# Patient Record
Sex: MALE | Race: WHITE | NOT HISPANIC OR LATINO | Employment: FULL TIME | URBAN - METROPOLITAN AREA
[De-identification: names, ages, dates, MRNs, and addresses within clinical notes are randomized per-mention and may not be internally consistent; named-entity substitution may affect disease eponyms.]

---

## 2019-04-25 ENCOUNTER — TELEPHONE (OUTPATIENT)
Dept: SCHEDULING | Facility: IMAGING CENTER | Age: 49
End: 2019-04-25

## 2019-05-07 ENCOUNTER — OFFICE VISIT (OUTPATIENT)
Dept: MEDICAL GROUP | Facility: PHYSICIAN GROUP | Age: 49
End: 2019-05-07
Payer: COMMERCIAL

## 2019-05-07 VITALS
HEART RATE: 70 BPM | RESPIRATION RATE: 18 BRPM | DIASTOLIC BLOOD PRESSURE: 70 MMHG | BODY MASS INDEX: 29.92 KG/M2 | WEIGHT: 209 LBS | HEIGHT: 70 IN | SYSTOLIC BLOOD PRESSURE: 108 MMHG | TEMPERATURE: 98.4 F | OXYGEN SATURATION: 97 %

## 2019-05-07 DIAGNOSIS — E78.5 HYPERLIPIDEMIA, UNSPECIFIED HYPERLIPIDEMIA TYPE: ICD-10-CM

## 2019-05-07 DIAGNOSIS — Z79.899 ON STATIN THERAPY: ICD-10-CM

## 2019-05-07 DIAGNOSIS — R73.01 IMPAIRED FASTING GLUCOSE: ICD-10-CM

## 2019-05-07 DIAGNOSIS — Z72.0 TOBACCO USE: ICD-10-CM

## 2019-05-07 DIAGNOSIS — Z23 NEED FOR VACCINATION: ICD-10-CM

## 2019-05-07 DIAGNOSIS — I10 ESSENTIAL HYPERTENSION: ICD-10-CM

## 2019-05-07 DIAGNOSIS — B07.8 OTHER VIRAL WARTS: ICD-10-CM

## 2019-05-07 DIAGNOSIS — R20.9 SKIN SENSATION DISTURBANCE: ICD-10-CM

## 2019-05-07 PROCEDURE — 90471 IMMUNIZATION ADMIN: CPT | Performed by: FAMILY MEDICINE

## 2019-05-07 PROCEDURE — 99204 OFFICE O/P NEW MOD 45 MIN: CPT | Mod: 25 | Performed by: FAMILY MEDICINE

## 2019-05-07 PROCEDURE — 90715 TDAP VACCINE 7 YRS/> IM: CPT | Performed by: FAMILY MEDICINE

## 2019-05-07 PROCEDURE — 17003 DESTRUCT PREMALG LES 2-14: CPT | Performed by: FAMILY MEDICINE

## 2019-05-07 PROCEDURE — 17000 DESTRUCT PREMALG LESION: CPT | Performed by: FAMILY MEDICINE

## 2019-05-07 RX ORDER — LISINOPRIL 5 MG/1
5 TABLET ORAL DAILY
Qty: 90 TAB | Refills: 1 | Status: SHIPPED | OUTPATIENT
Start: 2019-05-07 | End: 2019-10-25 | Stop reason: SDUPTHER

## 2019-05-07 RX ORDER — VARENICLINE TARTRATE 1 MG/1
1 TABLET, FILM COATED ORAL 2 TIMES DAILY
Qty: 60 TAB | Refills: 3 | Status: SHIPPED | OUTPATIENT
Start: 2019-05-07 | End: 2019-05-21 | Stop reason: SDUPTHER

## 2019-05-07 RX ORDER — ROSUVASTATIN CALCIUM 5 MG/1
5 TABLET, COATED ORAL EVERY EVENING
COMMUNITY
End: 2020-07-22 | Stop reason: SDUPTHER

## 2019-05-07 RX ORDER — LISINOPRIL 10 MG/1
10 TABLET ORAL DAILY
COMMUNITY
End: 2019-05-07

## 2019-05-07 ASSESSMENT — PATIENT HEALTH QUESTIONNAIRE - PHQ9: CLINICAL INTERPRETATION OF PHQ2 SCORE: 0

## 2019-05-07 NOTE — PROGRESS NOTES
"cc: Hypertension      Subjective:     Juan M Medellin is a 49 y.o. male presenting for the following:     Hypertension: diagnosed by his last PCP. Patient reports that his BP was never very elevated and has been low while on lisinopril. He does get slightly dizzy with bending over but he is not sure if it has always been like that or if it got worse with the lisinopril.    Hyperlipidemia: Has been taking low dose crestor for many months without side effect. Was having this checked at least yearly. Last check in Jan 2018.     Was having his HA1C checked every 3 months for prediabetes. Patient denies any polyuria/polydipsia, rashes, recurrent infections, changes in vision, changes in sensation.    Warts: Patient first started with wart on his left index finger in his 20s but it has now spread to cover most of the finger tip. It is not painful but it is not pretty. Also, he has had 2 clusters on his right knuckles for more than a year that are not improving. These he does sometimes bump and they are painful.     Review of systems:  All others reviewed and are negative.       Current Outpatient Prescriptions:   •  rosuvastatin (CRESTOR) 5 MG Tab, Take 5 mg by mouth every evening., Disp: , Rfl:   •  lisinopril (PRINIVIL) 5 MG Tab, Take 1 Tab by mouth every day., Disp: 90 Tab, Rfl: 1  •  varenicline (CHANTIX STARTING MONTH DEENA) 0.5 MG X 11 & 1 MG X 42 tablet, Days 1-3: 0.5 mg once daily. Days 4-7: 0.5 mg twice daily. Then 1 mg twice daily., Disp: 56 Tab, Rfl: 0  •  varenicline (CHANTIX CONTINUING MONTH DEENA) 1 MG tablet, Take 1 Tab by mouth 2 times a day., Disp: 60 Tab, Rfl: 3    Allergies, past medical history, past surgical history, family history, social history reviewed and updated    Objective:     Vitals: /70 (BP Location: Left arm, Patient Position: Sitting, BP Cuff Size: Adult)   Pulse 70   Temp 36.9 °C (98.4 °F) (Temporal)   Resp 18   Ht 1.778 m (5' 10\")   Wt 94.8 kg (209 lb)   SpO2 97%   BMI 29.99 " kg/m²   General: Alert, pleasant, NAD  HEENT: Normocephalic.   EOMI, no icterus or pallor.  Conjunctivae and lids normal. External ears normal. Oropharynx non-erythematous, mucous membranes moist.    Neck supple.  No thyromegaly or masses palpated. No cervical or supraclavicular lymphadenopathy.  Heart: Regular rate and rhythm.  S1 and S2 normal.  No murmurs appreciated.  Respiratory: Normal respiratory effort.  Clear to auscultation bilaterally.  Abdomen: Non-distended, soft  Skin: Warm, dry. Right 3rd MCP joint with cluster of raised fingerlike projections and small, ~2mm area on 4th MCP joint.   Left 2nd digit with pale skin in patch covering distal finger.   Musculoskeletal: Gait is normal.  Moves all extremities well.  Extremities: No leg edema.    Neurological: gait is normal, CN2-12 grossly intact  Psych:  Affect is normal, grooming is appropriate.    CRYOTHERAPY:  Discussed risks of cryotherapy including being ineffective, ulceration at site, skin discoloration. Patient verbally agreed. 3 applications of cryotherapy were applied to 2 lesions on right hand, on the 2nd and 3rd MCP joints. Patient tolerated procedure well. Aftercare instructions given.      Assessment/Plan:     Juan M was seen today for annual exam.    Diagnoses and all orders for this visit:    Impaired fasting glucose: We will monitor with hemoglobin A1c to ensure no progression to diabetes.  -     HEMOGLOBIN A1C; Future    Essential hypertension: Patient slightly hypotensive today and does get dizzy with bending.  Will decrease dose to 5 mg tablet.  We will also check kidney and hepatic function.  -     lisinopril (PRINIVIL) 5 MG Tab; Take 1 Tab by mouth every day.  -     Comp Metabolic Panel; Future    Hyperlipidemia, unspecified hyperlipidemia type: Chronic well-controlled problem with low-dose Crestor daily.  On statin therapy  -     Comp Metabolic Panel; Future    Need for vaccination Patient due for vaccination.  Patient warned of  possible side effect including pain, reaction at injection site, fatigue, low-grade fever.  Also, patient warned of uncommon severe reactions including allergic reaction/anaphylaxis.   -     Tdap Vaccine =>8YO IM    Tobacco use: Patient has used Chantix in the past but still found it difficult to quit.  But now, his job is less conducive to smoking so he believes that he may be more successful.  Patient warned of common side effects of Chantix.  -     varenicline (CHANTIX STARTING MONTH DEENA) 0.5 MG X 11 & 1 MG X 42 tablet; Days 1-3: 0.5 mg once daily. Days 4-7: 0.5 mg twice daily. Then 1 mg twice daily.  -     varenicline (CHANTIX CONTINUING MONTH DEENA) 1 MG tablet; Take 1 Tab by mouth 2 times a day.    Other viral warts: Area of right hand treated with cryotherapy today.  But area on left index finger covering most of finger and will likely need more treatment than cryotherapy alone.  Will refer to dermatology.  -     REFERRAL TO DERMATOLOGY      Return in about 6 months (around 11/7/2019), or if symptoms worsen or fail to improve.

## 2019-05-21 DIAGNOSIS — Z72.0 TOBACCO USE: ICD-10-CM

## 2019-05-21 NOTE — TELEPHONE ENCOUNTER
*PT NEEDS TO ESTABLISH WITH A PCP,  PHARMACY REQUESTING 90 DAYS SUPPLY*  Was the patient seen in the last year in this department? Yes    Does patient have an active prescription for medications requested? Yes    Received Request Via: Pharmacy      Pt met protocol?: Yes    LAST OV 05/07/2019

## 2019-05-22 RX ORDER — VARENICLINE TARTRATE 1 MG/1
1 TABLET, FILM COATED ORAL 2 TIMES DAILY
Qty: 180 TAB | Refills: 0 | Status: SHIPPED | OUTPATIENT
Start: 2019-05-22 | End: 2020-07-22

## 2020-01-24 ENCOUNTER — APPOINTMENT (RX ONLY)
Dept: URBAN - METROPOLITAN AREA CLINIC 4 | Facility: CLINIC | Age: 50
Setting detail: DERMATOLOGY
End: 2020-01-24

## 2020-01-24 DIAGNOSIS — B07.8 OTHER VIRAL WARTS: ICD-10-CM

## 2020-01-24 PROBLEM — D48.5 NEOPLASM OF UNCERTAIN BEHAVIOR OF SKIN: Status: ACTIVE | Noted: 2020-01-24

## 2020-01-24 PROCEDURE — 17110 DESTRUCTION B9 LES UP TO 14: CPT

## 2020-01-24 PROCEDURE — ? ADDITIONAL NOTES

## 2020-01-24 PROCEDURE — 11102 TANGNTL BX SKIN SINGLE LES: CPT | Mod: 59

## 2020-01-24 PROCEDURE — ? COUNSELING

## 2020-01-24 PROCEDURE — ? LIQUID NITROGEN

## 2020-01-24 PROCEDURE — ? BIOPSY BY SHAVE METHOD

## 2020-01-24 ASSESSMENT — LOCATION DETAILED DESCRIPTION DERM
LOCATION DETAILED: RIGHT PROXIMAL DORSAL INDEX FINGER
LOCATION DETAILED: RIGHT DORSAL MIDDLE METACARPOPHALANGEAL JOINT
LOCATION DETAILED: RIGHT ULNAR DORSAL HAND
LOCATION DETAILED: RIGHT PROXIMAL DORSAL MIDDLE FINGER
LOCATION DETAILED: LEFT DISTAL RADIAL PALMAR INDEX FINGER

## 2020-01-24 ASSESSMENT — LOCATION SIMPLE DESCRIPTION DERM
LOCATION SIMPLE: RIGHT MIDDLE FINGER
LOCATION SIMPLE: RIGHT INDEX FINGER
LOCATION SIMPLE: RIGHT HAND
LOCATION SIMPLE: LEFT INDEX FINGER

## 2020-01-24 ASSESSMENT — LOCATION ZONE DERM
LOCATION ZONE: FINGER
LOCATION ZONE: HAND

## 2020-01-24 NOTE — HPI: WARTS (VERRUCA)
How Severe Are Your Warts?: mild
Is This A New Presentation, Or A Follow-Up?: Warts
Treatment Number (Optional): 1
Additional History: Patient had his warts frozen with liquid nitrogen by his PCP once, but they have come back. He has tried OTC treatments with no result

## 2020-01-24 NOTE — PROCEDURE: LIQUID NITROGEN
Consent: The patient's consent was obtained including but not limited to risks of crusting, scabbing, blistering, scarring, darker or lighter pigmentary change, recurrence, incomplete removal and infection.
Detail Level: Detailed
Medical Necessity Information: It is in your best interest to select a reason for this procedure from the list below. All of these items fulfill various CMS LCD requirements except the new and changing color options.
Post-Care Instructions: I reviewed with the patient in detail post-care instructions. Patient is to wear sunprotection, and avoid picking at any of the treated lesions. Pt may apply Vaseline to crusted or scabbing areas.
Render Note In Bullet Format When Appropriate: No
Number Of Freeze-Thaw Cycles: 2 freeze-thaw cycles
Duration Of Freeze Thaw-Cycle (Seconds): 10-15
Medical Necessity Clause: This procedure was medically necessary because the lesions that were treated were:
Pared With?: curette

## 2020-01-24 NOTE — PROCEDURE: BIOPSY BY SHAVE METHOD
Anesthesia Type: 1% lidocaine with epinephrine
Destruction After The Procedure: No
Lab Facility: 
Cryotherapy Text: The wound bed was treated with cryotherapy after the biopsy was performed.
Notification Instructions: Patient will be notified of biopsy results. However, patient instructed to call the office if not contacted within 2 weeks.
Additional Anesthesia Volume In Cc (Will Not Render If 0): 0
Size Of Lesion In Cm: 3.5
Wound Care: Petrolatum
Depth Of Biopsy: dermis
Electrodesiccation Text: The wound bed was treated with electrodesiccation after the biopsy was performed.
X Size Of Lesion In Cm: 2
Consent: Written consent was obtained and risks were reviewed including but not limited to scarring, infection, bleeding, scabbing, incomplete removal, nerve damage and allergy to anesthesia.
Biopsy Type: H and E
Electrodesiccation And Curettage Text: The wound bed was treated with electrodesiccation and curettage after the biopsy was performed.
Type Of Destruction Used: Curettage
Anesthesia Volume In Cc: 0.5
Dressing: bandage
Was A Bandage Applied: Yes
Silver Nitrate Text: The wound bed was treated with silver nitrate after the biopsy was performed.
Hemostasis: Drysol
Biopsy Method: Dermablade
Billing Type: Third-Party Bill
Curettage Text: The wound bed was treated with curettage after the biopsy was performed.
Detail Level: Detailed
Lab: [id number]
Post-Care Instructions: I reviewed with the patient in detail post-care instructions. Patient is to keep the biopsy site dry overnight, and then apply bacitracin twice daily until healed. Patient may apply hydrogen peroxide soaks to remove any crusting.

## 2020-01-24 NOTE — PROCEDURE: MIPS QUALITY
Quality 110: Preventive Care And Screening: Influenza Immunization: Influenza Immunization Administered during Influenza season
Quality 402: Tobacco Use And Help With Quitting Among Adolescents: Patient screened for tobacco and is a smoker AND received Cessation Counseling
Detail Level: Detailed
Quality 130: Documentation Of Current Medications In The Medical Record: Current Medications Documented
Quality 226: Preventive Care And Screening: Tobacco Use: Screening And Cessation Intervention: Patient screened for tobacco use, is a smoker AND did not received Cessation Counseling for Medical Reasons

## 2020-03-13 ENCOUNTER — APPOINTMENT (RX ONLY)
Dept: URBAN - METROPOLITAN AREA CLINIC 4 | Facility: CLINIC | Age: 50
Setting detail: DERMATOLOGY
End: 2020-03-13

## 2020-03-13 DIAGNOSIS — B07.8 OTHER VIRAL WARTS: ICD-10-CM

## 2020-03-13 PROCEDURE — ? COUNSELING

## 2020-03-13 PROCEDURE — ? ADDITIONAL NOTES

## 2020-03-13 PROCEDURE — 17110 DESTRUCTION B9 LES UP TO 14: CPT

## 2020-03-13 PROCEDURE — ? LIQUID NITROGEN

## 2020-03-13 ASSESSMENT — LOCATION ZONE DERM
LOCATION ZONE: FINGER
LOCATION ZONE: HAND

## 2020-03-13 ASSESSMENT — LOCATION SIMPLE DESCRIPTION DERM
LOCATION SIMPLE: RIGHT INDEX FINGER
LOCATION SIMPLE: LEFT INDEX FINGER
LOCATION SIMPLE: RIGHT HAND
LOCATION SIMPLE: RIGHT MIDDLE FINGER

## 2020-03-13 ASSESSMENT — LOCATION DETAILED DESCRIPTION DERM
LOCATION DETAILED: RIGHT PROXIMAL DORSAL INDEX FINGER
LOCATION DETAILED: RIGHT DORSAL MIDDLE METACARPOPHALANGEAL JOINT
LOCATION DETAILED: RIGHT ULNAR DORSAL HAND
LOCATION DETAILED: LEFT INDEX FINGERTIP
LOCATION DETAILED: RIGHT PROXIMAL DORSAL MIDDLE FINGER

## 2020-03-13 NOTE — PROCEDURE: MIPS QUALITY
Quality 130: Documentation Of Current Medications In The Medical Record: Current Medications Documented
Quality 110: Preventive Care And Screening: Influenza Immunization: Influenza Immunization Administered during Influenza season
Quality 226: Preventive Care And Screening: Tobacco Use: Screening And Cessation Intervention: Patient screened for tobacco use, is a smoker AND did not received Cessation Counseling for Medical Reasons
Detail Level: Detailed
Quality 402: Tobacco Use And Help With Quitting Among Adolescents: Patient screened for tobacco and is a smoker AND received Cessation Counseling

## 2020-03-13 NOTE — PROCEDURE: LIQUID NITROGEN
Consent: The patient's consent was obtained including but not limited to risks of crusting, scabbing, blistering, scarring, darker or lighter pigmentary change, recurrence, incomplete removal and infection.
Render Note In Bullet Format When Appropriate: No
Medical Necessity Clause: This procedure was medically necessary because the lesions that were treated were:
Detail Level: Detailed
Medical Necessity Information: It is in your best interest to select a reason for this procedure from the list below. All of these items fulfill various CMS LCD requirements except the new and changing color options.
Duration Of Freeze Thaw-Cycle (Seconds): 10-15
Post-Care Instructions: I reviewed with the patient in detail post-care instructions. Patient is to wear sunprotection, and avoid picking at any of the treated lesions. Pt may apply Vaseline to crusted or scabbing areas.
Number Of Freeze-Thaw Cycles: 2 freeze-thaw cycles

## 2020-03-13 NOTE — PROCEDURE: ADDITIONAL NOTES
Detail Level: Simple
Additional Notes: Lesions on right hand improved, left index finger without significant change\\nDiscussed candida injection. Patient defers for now \\nContinue LN2 today\\nContinue OTC salicylic acid

## 2020-04-20 DIAGNOSIS — I10 ESSENTIAL HYPERTENSION: ICD-10-CM

## 2020-04-20 NOTE — TELEPHONE ENCOUNTER
Was the patient seen in the last year in this department? Yes    Does patient have an active prescription for medications requested? No     Received Request Via: Pharmacy      Pt met protocol?: No, OV 5/19   BP Readings from Last 1 Encounters:   05/07/19 108/70

## 2020-04-21 RX ORDER — LISINOPRIL 5 MG/1
TABLET ORAL
Qty: 90 TAB | Refills: 0 | Status: SHIPPED | OUTPATIENT
Start: 2020-04-21 | End: 2020-07-22 | Stop reason: SDUPTHER

## 2020-06-19 ENCOUNTER — APPOINTMENT (RX ONLY)
Dept: URBAN - METROPOLITAN AREA CLINIC 20 | Facility: CLINIC | Age: 50
Setting detail: DERMATOLOGY
End: 2020-06-19

## 2020-06-19 DIAGNOSIS — B07.8 OTHER VIRAL WARTS: ICD-10-CM

## 2020-06-19 PROCEDURE — 17110 DESTRUCTION B9 LES UP TO 14: CPT

## 2020-06-19 PROCEDURE — ? PRESCRIPTION

## 2020-06-19 PROCEDURE — ? COUNSELING

## 2020-06-19 PROCEDURE — ? LIQUID NITROGEN

## 2020-06-19 PROCEDURE — ? ADDITIONAL NOTES

## 2020-06-19 RX ORDER — IMIQUIMOD 50 MG/G
CREAM TOPICAL TIW
Qty: 12 | Refills: 3 | Status: ERX | COMMUNITY
Start: 2020-06-19

## 2020-06-19 RX ADMIN — IMIQUIMOD 1: 50 CREAM TOPICAL at 00:00

## 2020-06-19 ASSESSMENT — LOCATION SIMPLE DESCRIPTION DERM
LOCATION SIMPLE: RIGHT INDEX FINGER
LOCATION SIMPLE: RIGHT MIDDLE FINGER
LOCATION SIMPLE: RIGHT HAND
LOCATION SIMPLE: LEFT INDEX FINGER

## 2020-06-19 ASSESSMENT — LOCATION ZONE DERM
LOCATION ZONE: HAND
LOCATION ZONE: FINGER

## 2020-06-19 ASSESSMENT — LOCATION DETAILED DESCRIPTION DERM
LOCATION DETAILED: RIGHT PROXIMAL DORSAL MIDDLE FINGER
LOCATION DETAILED: RIGHT DORSAL MIDDLE METACARPOPHALANGEAL JOINT
LOCATION DETAILED: RIGHT PROXIMAL DORSAL INDEX FINGER
LOCATION DETAILED: RIGHT ULNAR DORSAL HAND
LOCATION DETAILED: LEFT INDEX FINGERTIP

## 2020-06-19 NOTE — PROCEDURE: ADDITIONAL NOTES
Detail Level: Simple
Additional Notes: Lesions on right hand improved significantly, left index finger without significant change (though on biopsy did also have some changes consistent with LSC)\\nDiscussed LN2 vs candida injection\\nPatient interested in continuing LN2 given interval improvement\\nDiscussed topical options at home - patient is not currently using\\nStart imiquimod 5% cream to left index finger three times a week at night, wash off in morning, make sure not to touch eyes/face after application. Discussed expected side effects including possibility of flu like symptoms and local reactions\\nCan alternate imiquimod with salicylic acid at home

## 2020-07-22 ENCOUNTER — OFFICE VISIT (OUTPATIENT)
Dept: MEDICAL GROUP | Facility: PHYSICIAN GROUP | Age: 50
End: 2020-07-22
Payer: COMMERCIAL

## 2020-07-22 VITALS
HEART RATE: 67 BPM | DIASTOLIC BLOOD PRESSURE: 72 MMHG | TEMPERATURE: 97.6 F | SYSTOLIC BLOOD PRESSURE: 116 MMHG | WEIGHT: 207 LBS | HEIGHT: 70 IN | BODY MASS INDEX: 29.63 KG/M2 | OXYGEN SATURATION: 96 %

## 2020-07-22 DIAGNOSIS — M79.89 SWELLING OF BOTH HANDS: ICD-10-CM

## 2020-07-22 DIAGNOSIS — E78.5 HYPERLIPIDEMIA, UNSPECIFIED HYPERLIPIDEMIA TYPE: ICD-10-CM

## 2020-07-22 DIAGNOSIS — Z72.0 TOBACCO USE: ICD-10-CM

## 2020-07-22 DIAGNOSIS — R73.01 IMPAIRED FASTING GLUCOSE: ICD-10-CM

## 2020-07-22 DIAGNOSIS — I10 ESSENTIAL HYPERTENSION: ICD-10-CM

## 2020-07-22 DIAGNOSIS — G57.12 MERALGIA PARESTHETICA OF LEFT SIDE: ICD-10-CM

## 2020-07-22 DIAGNOSIS — Z23 NEED FOR VACCINATION: ICD-10-CM

## 2020-07-22 DIAGNOSIS — Z12.5 SCREENING FOR PROSTATE CANCER: ICD-10-CM

## 2020-07-22 PROCEDURE — 99214 OFFICE O/P EST MOD 30 MIN: CPT | Mod: 25 | Performed by: FAMILY MEDICINE

## 2020-07-22 PROCEDURE — 90471 IMMUNIZATION ADMIN: CPT | Performed by: FAMILY MEDICINE

## 2020-07-22 PROCEDURE — 90732 PPSV23 VACC 2 YRS+ SUBQ/IM: CPT | Performed by: FAMILY MEDICINE

## 2020-07-22 RX ORDER — ROSUVASTATIN CALCIUM 5 MG/1
5 TABLET, COATED ORAL EVERY EVENING
Qty: 90 TAB | Refills: 0 | Status: SHIPPED | OUTPATIENT
Start: 2020-07-22 | End: 2020-10-13 | Stop reason: SDUPTHER

## 2020-07-22 RX ORDER — IMIQUIMOD 12.5 MG/.25G
CREAM TOPICAL
COMMUNITY
Start: 2020-07-16 | End: 2021-01-20

## 2020-07-22 RX ORDER — LISINOPRIL 5 MG/1
5 TABLET ORAL
Qty: 90 TAB | Refills: 0 | Status: SHIPPED | OUTPATIENT
Start: 2020-07-22 | End: 2020-10-13 | Stop reason: SDUPTHER

## 2020-07-22 ASSESSMENT — PATIENT HEALTH QUESTIONNAIRE - PHQ9: CLINICAL INTERPRETATION OF PHQ2 SCORE: 0

## 2020-07-22 NOTE — PROGRESS NOTES
"cc: Hypertension      Subjective:     Juan M Medellin is a 50 y.o. male presenting for the following:     HTN: patient is taking lisinopril regularly. Patient denies any chest pain, shortness of breath, palpitations, swelling, or lightheadedness.    Patient has been diagnosed with hyperlipidemia in the past.  He admits that he ran out of rosuvastatin some months ago and stopped taking this.  He denies having any side effects when he was taking it.    Thigh burning: For the last few months, patient is having a strange, burning/tingling pain along his lateral, left leg.  This pain comes and goes and seems to be more likely to occur when he is sitting.  He does not have any weakness or numbness in the leg.  No changes to bladder or bowel control.  No lower back pain.    When it is hot outside, patient will have some mild swelling in both of his hands.  This does not happen when it is cool and it is not uncomfortable.  He can still wear his rings but has a hard time getting them off.  No skin changes or cold hands.    Review of systems:  All others reviewed and are negative.       Current Outpatient Medications:   •  rosuvastatin (CRESTOR) 5 MG Tab, Take 1 Tab by mouth every evening., Disp: 90 Tab, Rfl: 0  •  lisinopril (PRINIVIL) 5 MG Tab, Take 1 Tab by mouth every day., Disp: 90 Tab, Rfl: 0  •  imiquimod (ALDARA) 5 % cream, , Disp: , Rfl:     Allergies, past medical history, past surgical history, family history, social history reviewed and updated    Objective:     Vitals: /72 (BP Location: Right arm, Patient Position: Sitting, BP Cuff Size: Adult)   Pulse 67   Temp 36.4 °C (97.6 °F) (Temporal)   Ht 1.778 m (5' 10\")   Wt 93.9 kg (207 lb)   SpO2 96%   BMI 29.70 kg/m²   General: Alert, pleasant, NAD  HEENT: Normocephalic.   EOMI, no icterus or pallor.    Neck supple.  No thyromegaly or masses palpated. No cervical or supraclavicular lymphadenopathy.  Heart: Regular rate and rhythm.  S1 and S2 normal.  No " murmurs appreciated.  Respiratory: Normal respiratory effort.  Clear to auscultation bilaterally.  Skin: Warm, dry, no rashes in exposed areas.  Musculoskeletal: Gait is normal.  Moves all extremities well.  Extremities: Hands normal.  Cap refill less than 2 seconds.  Neurological: Lower extremities with sensation grossly intact,  tone/strength normal, gait is normal, patellar deep tendon reflexes 2+ bilaterally.    Assessment/Plan:     Juan M was seen today for follow-up.    Diagnoses and all orders for this visit:    Impaired fasting glucose: We will ensure this has not worsened.  -     HEMOGLOBIN A1C; Future    Hyperlipidemia, unspecified hyperlipidemia type: Suggest patient restart this and will monitor lipid profile and CMP.  -     Lipid Profile; Future  -     rosuvastatin (CRESTOR) 5 MG Tab; Take 1 Tab by mouth every evening.    Essential hypertension well-controlled chronic problem will monitor renal function.  -     Comp Metabolic Panel; Future  -     lisinopril (PRINIVIL) 5 MG Tab; Take 1 Tab by mouth every day.    Swelling of both hands: Most likely benign from heat.  However, will ensure no anemia, renal dysfunction, thyroid dysfunction.  -     Comp Metabolic Panel; Future  -     TSH WITH REFLEX TO FT4; Future  -     CBC WITHOUT DIFFERENTIAL; Future    Tobacco use: Patient admits to still smoking cigarettes regularly.  Cessation encouraged.  -     CBC WITHOUT DIFFERENTIAL; Future    Meralgia paresthetica of left side: Benign exam today.  Most likely meralgia paresthetica by history.  Suggest daily stretching and avoidance of tight clothes and belts.  To let us know if anything changes or worsens.    Need for vaccination Patient due for vaccination.  Patient warned of possible side effect including pain, reaction at injection site, fatigue, low-grade fever.  Also, patient warned of uncommon severe reactions including allergic reaction/anaphylaxis.   -     Pneumococal Polysaccharide Vaccine 23-Valent =>3YO  SQ/IM    Screening for prostate cancer Patient counseled on the risks and benefits of PSA testing.  Patient verbalizes understanding of risk of false positive, false negative, and need for referral and possibly prostate biopsy if positive.  -     PROSTATE SPECIFIC AG SCREENING; Future      Return in about 3 months (around 10/22/2020), or if symptoms worsen or fail to improve.

## 2020-08-14 ENCOUNTER — APPOINTMENT (RX ONLY)
Dept: URBAN - METROPOLITAN AREA CLINIC 20 | Facility: CLINIC | Age: 50
Setting detail: DERMATOLOGY
End: 2020-08-14

## 2020-08-14 ENCOUNTER — HOSPITAL ENCOUNTER (OUTPATIENT)
Dept: LAB | Facility: MEDICAL CENTER | Age: 50
End: 2020-08-14
Attending: FAMILY MEDICINE
Payer: COMMERCIAL

## 2020-08-14 DIAGNOSIS — Z72.0 TOBACCO USE: ICD-10-CM

## 2020-08-14 DIAGNOSIS — I10 ESSENTIAL HYPERTENSION: ICD-10-CM

## 2020-08-14 DIAGNOSIS — E78.5 HYPERLIPIDEMIA, UNSPECIFIED HYPERLIPIDEMIA TYPE: ICD-10-CM

## 2020-08-14 DIAGNOSIS — B07.8 OTHER VIRAL WARTS: ICD-10-CM

## 2020-08-14 DIAGNOSIS — Z12.5 SCREENING FOR PROSTATE CANCER: ICD-10-CM

## 2020-08-14 DIAGNOSIS — R73.01 IMPAIRED FASTING GLUCOSE: ICD-10-CM

## 2020-08-14 DIAGNOSIS — M79.89 SWELLING OF BOTH HANDS: ICD-10-CM

## 2020-08-14 LAB
ALBUMIN SERPL BCP-MCNC: 4.8 G/DL (ref 3.2–4.9)
ALBUMIN/GLOB SERPL: 2.4 G/DL
ALP SERPL-CCNC: 62 U/L (ref 30–99)
ALT SERPL-CCNC: 28 U/L (ref 2–50)
ANION GAP SERPL CALC-SCNC: 13 MMOL/L (ref 7–16)
AST SERPL-CCNC: 19 U/L (ref 12–45)
BILIRUB SERPL-MCNC: 0.7 MG/DL (ref 0.1–1.5)
BUN SERPL-MCNC: 18 MG/DL (ref 8–22)
CALCIUM SERPL-MCNC: 9.5 MG/DL (ref 8.5–10.5)
CHLORIDE SERPL-SCNC: 106 MMOL/L (ref 96–112)
CHOLEST SERPL-MCNC: 111 MG/DL (ref 100–199)
CO2 SERPL-SCNC: 20 MMOL/L (ref 20–33)
CREAT SERPL-MCNC: 0.91 MG/DL (ref 0.5–1.4)
ERYTHROCYTE [DISTWIDTH] IN BLOOD BY AUTOMATED COUNT: 45 FL (ref 35.9–50)
EST. AVERAGE GLUCOSE BLD GHB EST-MCNC: 108 MG/DL
FASTING STATUS PATIENT QL REPORTED: NORMAL
GLOBULIN SER CALC-MCNC: 2 G/DL (ref 1.9–3.5)
GLUCOSE SERPL-MCNC: 83 MG/DL (ref 65–99)
HBA1C MFR BLD: 5.4 % (ref 0–5.6)
HCT VFR BLD AUTO: 50.8 % (ref 42–52)
HDLC SERPL-MCNC: 39 MG/DL
HGB BLD-MCNC: 16.8 G/DL (ref 14–18)
LDLC SERPL CALC-MCNC: 62 MG/DL
MCH RBC QN AUTO: 31.5 PG (ref 27–33)
MCHC RBC AUTO-ENTMCNC: 33.1 G/DL (ref 33.7–35.3)
MCV RBC AUTO: 95.1 FL (ref 81.4–97.8)
PLATELET # BLD AUTO: 168 K/UL (ref 164–446)
PMV BLD AUTO: 11.9 FL (ref 9–12.9)
POTASSIUM SERPL-SCNC: 4.1 MMOL/L (ref 3.6–5.5)
PROT SERPL-MCNC: 6.8 G/DL (ref 6–8.2)
RBC # BLD AUTO: 5.34 M/UL (ref 4.7–6.1)
SODIUM SERPL-SCNC: 139 MMOL/L (ref 135–145)
TRIGL SERPL-MCNC: 52 MG/DL (ref 0–149)
WBC # BLD AUTO: 9.8 K/UL (ref 4.8–10.8)

## 2020-08-14 PROCEDURE — 80053 COMPREHEN METABOLIC PANEL: CPT

## 2020-08-14 PROCEDURE — 17110 DESTRUCTION B9 LES UP TO 14: CPT

## 2020-08-14 PROCEDURE — 84153 ASSAY OF PSA TOTAL: CPT

## 2020-08-14 PROCEDURE — 83036 HEMOGLOBIN GLYCOSYLATED A1C: CPT

## 2020-08-14 PROCEDURE — 84443 ASSAY THYROID STIM HORMONE: CPT

## 2020-08-14 PROCEDURE — 36415 COLL VENOUS BLD VENIPUNCTURE: CPT

## 2020-08-14 PROCEDURE — 80061 LIPID PANEL: CPT

## 2020-08-14 PROCEDURE — ? LIQUID NITROGEN

## 2020-08-14 PROCEDURE — ? ADDITIONAL NOTES

## 2020-08-14 PROCEDURE — ? COUNSELING

## 2020-08-14 PROCEDURE — 85027 COMPLETE CBC AUTOMATED: CPT

## 2020-08-14 ASSESSMENT — LOCATION DETAILED DESCRIPTION DERM
LOCATION DETAILED: LEFT INDEX FINGERTIP
LOCATION DETAILED: RIGHT VENTRAL WRIST
LOCATION DETAILED: RIGHT DORSAL MIDDLE METACARPOPHALANGEAL JOINT
LOCATION DETAILED: RIGHT PROXIMAL DORSAL INDEX FINGER

## 2020-08-14 ASSESSMENT — LOCATION ZONE DERM
LOCATION ZONE: FINGER
LOCATION ZONE: ARM
LOCATION ZONE: HAND

## 2020-08-14 ASSESSMENT — LOCATION SIMPLE DESCRIPTION DERM
LOCATION SIMPLE: RIGHT INDEX FINGER
LOCATION SIMPLE: RIGHT HAND
LOCATION SIMPLE: RIGHT WRIST
LOCATION SIMPLE: LEFT INDEX FINGER

## 2020-08-14 NOTE — PROCEDURE: ADDITIONAL NOTES
Detail Level: Simple
Additional Notes: Lesions on right hand improved significantly and near resolved, left index finger without significant change (though on biopsy did also have some changes consistent with LSC)\\nDiscussed LN2 vs candida injection\\nPatient interested in continuing LN2 given interval improvement\\nContinue imiquimod 5% cream to left index finger three times a week at night, wash off in morning, make sure not to touch eyes/face after application - patient not using consistently\\nConsider candida at next visit

## 2020-08-15 LAB
PSA SERPL-MCNC: 1.79 NG/ML (ref 0–4)
TSH SERPL DL<=0.005 MIU/L-ACNC: 0.85 UIU/ML (ref 0.38–5.33)

## 2020-10-13 DIAGNOSIS — E78.5 HYPERLIPIDEMIA, UNSPECIFIED HYPERLIPIDEMIA TYPE: ICD-10-CM

## 2020-10-13 DIAGNOSIS — I10 ESSENTIAL HYPERTENSION: ICD-10-CM

## 2020-10-13 RX ORDER — LISINOPRIL 5 MG/1
5 TABLET ORAL
Qty: 90 TAB | Refills: 0 | Status: SHIPPED | OUTPATIENT
Start: 2020-10-13 | End: 2021-01-29 | Stop reason: SDUPTHER

## 2020-10-13 RX ORDER — ROSUVASTATIN CALCIUM 5 MG/1
5 TABLET, COATED ORAL EVERY EVENING
Qty: 90 TAB | Refills: 0 | Status: SHIPPED | OUTPATIENT
Start: 2020-10-13 | End: 2021-01-29 | Stop reason: SDUPTHER

## 2020-10-27 ENCOUNTER — APPOINTMENT (OUTPATIENT)
Dept: RADIOLOGY | Facility: IMAGING CENTER | Age: 50
End: 2020-10-27
Attending: INTERNAL MEDICINE
Payer: COMMERCIAL

## 2020-10-27 ENCOUNTER — OFFICE VISIT (OUTPATIENT)
Dept: MEDICAL GROUP | Facility: PHYSICIAN GROUP | Age: 50
End: 2020-10-27
Payer: COMMERCIAL

## 2020-10-27 VITALS
DIASTOLIC BLOOD PRESSURE: 78 MMHG | SYSTOLIC BLOOD PRESSURE: 122 MMHG | HEART RATE: 60 BPM | TEMPERATURE: 98.8 F | OXYGEN SATURATION: 96 % | WEIGHT: 206 LBS | BODY MASS INDEX: 29.49 KG/M2 | HEIGHT: 70 IN

## 2020-10-27 DIAGNOSIS — G89.29 CHRONIC MIDLINE LOW BACK PAIN WITHOUT SCIATICA: ICD-10-CM

## 2020-10-27 DIAGNOSIS — I10 ESSENTIAL HYPERTENSION: ICD-10-CM

## 2020-10-27 DIAGNOSIS — G57.12 MERALGIA PARESTHETICA OF LEFT SIDE: ICD-10-CM

## 2020-10-27 DIAGNOSIS — M54.50 CHRONIC MIDLINE LOW BACK PAIN WITHOUT SCIATICA: ICD-10-CM

## 2020-10-27 DIAGNOSIS — Z72.0 TOBACCO USE: ICD-10-CM

## 2020-10-27 PROCEDURE — 72202 X-RAY EXAM SI JOINTS 3/> VWS: CPT | Mod: TC | Performed by: INTERNAL MEDICINE

## 2020-10-27 PROCEDURE — 99204 OFFICE O/P NEW MOD 45 MIN: CPT | Performed by: INTERNAL MEDICINE

## 2020-10-27 PROCEDURE — 72100 X-RAY EXAM L-S SPINE 2/3 VWS: CPT | Mod: TC | Performed by: INTERNAL MEDICINE

## 2020-10-27 RX ORDER — GABAPENTIN 300 MG/1
300 CAPSULE ORAL NIGHTLY PRN
Qty: 60 CAP | Refills: 2 | Status: SHIPPED | OUTPATIENT
Start: 2020-10-27 | End: 2021-01-20

## 2020-10-27 RX ORDER — LIDOCAINE 50 MG/G
1 PATCH TOPICAL EVERY 24 HOURS
Qty: 10 PATCH | Refills: 2 | Status: SHIPPED | OUTPATIENT
Start: 2020-10-27 | End: 2021-01-20

## 2020-10-27 ASSESSMENT — FIBROSIS 4 INDEX: FIB4 SCORE: 1.05

## 2020-10-27 NOTE — PROGRESS NOTES
New Patient to establish    Chief Complaint   Patient presents with   • Establish Care       Subjective:     History of Present Illness: Patient is a 50 y.o. male who is here today to establish primary care    1. Essential hypertension  Chronic, stable, compliant with lisinopril 5 mg daily, denies related symptoms    2. 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 Meralgia paresthetica of left side  -Chronic, left-sided, sometimes after the sleeping, he is not taking any medication for that, denied having tight clothing's, he is gaining some weight      Tobacco use  -Half pack per day for 30 years    Current Outpatient Medications on File Prior to Visit   Medication Sig Dispense Refill   • lisinopril (PRINIVIL) 5 MG Tab Take 1 Tab by mouth every day. 90 Tab 0   • rosuvastatin (CRESTOR) 5 MG Tab Take 1 Tab by mouth every evening. 90 Tab 0   • imiquimod (ALDARA) 5 % cream        No current facility-administered medications on file prior to visit.      No Known Allergies  Patient Active Problem List    Diagnosis Date Noted   • Chronic midline low back pain without sciatica 10/27/2020   • Meralgia paresthetica of left side 07/22/2020   • Impaired fasting glucose 05/07/2019   • Essential hypertension 05/07/2019   • Hyperlipidemia 05/07/2019   • Tobacco use 05/07/2019     Past Medical History:   Diagnosis Date   • Hypertension      No past surgical history on file.  Family History   Problem Relation Age of Onset   • Arrythmia Mother    • Other Father          "hypertension, diabetes     Social History     Tobacco Use   • Smoking status: Current Every Day Smoker     Packs/day: 1.00     Years: 30.00     Pack years: 30.00   • Smokeless tobacco: Never Used   Substance Use Topics   • Alcohol use: No   • Drug use: No         ROS:     - Constitutional: Negative for fever, chills,    - Eye: Negative for blurry vision    -ENT: Negative for ear pain    - Respiratory: Negative for cough, hemoptysis    - Cardiovascular: Negative for chest pain     - Gastrointestinal: Negative for abdominal pain    - Genitourinary: Negative for dysuria    - Musculoskeletal: Negative for joint swelling    - Skin: Negative for itching    - Neurological: Negative for focal weakness     - Psychiatric/Behavioral: Negative for depression        Physical Exam:     /78 (BP Location: Left arm, Patient Position: Sitting, BP Cuff Size: Adult)   Pulse 60   Temp 37.1 °C (98.8 °F) (Temporal)   Ht 1.778 m (5' 10\")   Wt 93.4 kg (206 lb)   SpO2 96%   BMI 29.56 kg/m²   General: Normal appearing. No distress.  ENT: oropharynx without exudates.    Eyes: conjunctiva clear lids without ptosis  Pulmonary: Clear to ausculation.  Normal effort.   Cardiovascular: Regular rate and rhythm  Abdomen: Soft, nontender,  Lymph: No cervical or supraclavicular palpable lymph nodes  Psych: Normal mood and affect.   Musculoskeletal, bilateral straight leg raise as well as contralateral leg raise were negative, no tenderness to palpation, no tenderness on hip joint     I have reviewed pertinent labs and diagnostic tests associated with this visit with specific comments listed under the assessment and plan below      Assessment and Plan:     1. Essential hypertension  Continue lisinopril as above    2. Chronic midline low back pain without sciatica  Rule out any sacroiliac joint issues as well as osteoarthritis of the lumbar area  -Heavily educated about NSAIDs and side effect including complication, advised to take it as " needed  - DX-SACROILIAC JOINTS 3+; Future  - DX-LUMBAR SPINE-2 OR 3 VIEWS; Future  - gabapentin (NEURONTIN) 300 MG Cap; Take 1 Cap by mouth at bedtime as needed.  Dispense: 60 Cap; Refill: 2  - lidocaine (LIDODERM) 5 % Patch; Apply 1 Patch to skin as directed every 24 hours.  Dispense: 10 Patch; Refill: 2  - REFERRAL TO PHYSICAL THERAPY Reason for Therapy: Eval/Treat/Report    3. Meralgia paresthetica of left side  - gabapentin (NEURONTIN) 300 MG Cap; Take 1 Cap by mouth at bedtime as needed.  Dispense: 60 Cap; Refill: 2    Tobacco use  -Counseled about the negative health risks of smoking, pt expressed understanding  -Encouraged smoking cessation  -CTM and f/u with support to quit     Follow Up:      Return in about 10 weeks (around 1/5/2021) for follow up, imaging,.    Please note that this dictation was created using voice recognition software. I have made every reasonable attempt to correct obvious errors, but I expect that there are errors of grammar and possibly content that I did not discover before finalizing the note.    Signed by: David Hernandez M.D.

## 2021-01-20 ENCOUNTER — OFFICE VISIT (OUTPATIENT)
Dept: MEDICAL GROUP | Facility: PHYSICIAN GROUP | Age: 51
End: 2021-01-20
Payer: COMMERCIAL

## 2021-01-20 VITALS
TEMPERATURE: 98.5 F | DIASTOLIC BLOOD PRESSURE: 74 MMHG | SYSTOLIC BLOOD PRESSURE: 116 MMHG | HEIGHT: 70 IN | HEART RATE: 69 BPM | BODY MASS INDEX: 29.99 KG/M2 | WEIGHT: 209.5 LBS | OXYGEN SATURATION: 96 %

## 2021-01-20 DIAGNOSIS — E66.9 OBESITY (BMI 30.0-34.9): ICD-10-CM

## 2021-01-20 DIAGNOSIS — I10 ESSENTIAL HYPERTENSION: ICD-10-CM

## 2021-01-20 DIAGNOSIS — Z78.9 ALCOHOL USE: ICD-10-CM

## 2021-01-20 PROBLEM — E66.811 OBESITY (BMI 30.0-34.9): Status: ACTIVE | Noted: 2021-01-20

## 2021-01-20 PROCEDURE — 99214 OFFICE O/P EST MOD 30 MIN: CPT | Performed by: INTERNAL MEDICINE

## 2021-01-20 ASSESSMENT — PATIENT HEALTH QUESTIONNAIRE - PHQ9: CLINICAL INTERPRETATION OF PHQ2 SCORE: 0

## 2021-01-20 ASSESSMENT — FIBROSIS 4 INDEX: FIB4 SCORE: 1.05

## 2021-01-21 NOTE — PROGRESS NOTES
"Established Patient    Chief Complaint   Patient presents with   • Follow-Up       Subjective:     HPI:   Juan M presents today with the following.    1. Obesity (BMI 30.0-34.9)  2. Alcohol use  3. Essential hypertension  Patient's blood pressure within normal range, he is taking lisinopril 5 mg daily, denies related symptoms  -Patient has access to a lot of unhealthy food item including a lot of sugars, he has also obesity and potential for chronic disease as well  >> Mention also he drinks alcohol at one session in 1 day sixpack of beers, denied having alcohol withdrawal    Patient Active Problem List    Diagnosis Date Noted   • Obesity (BMI 30.0-34.9) 01/20/2021   • Alcohol use 01/20/2021   • Chronic midline low back pain without sciatica 10/27/2020   • Meralgia paresthetica of left side 07/22/2020   • Impaired fasting glucose 05/07/2019   • Essential hypertension 05/07/2019   • Hyperlipidemia 05/07/2019   • Tobacco use 05/07/2019       Current Outpatient Medications on File Prior to Visit   Medication Sig Dispense Refill   • lisinopril (PRINIVIL) 5 MG Tab Take 1 Tab by mouth every day. 90 Tab 0   • rosuvastatin (CRESTOR) 5 MG Tab Take 1 Tab by mouth every evening. 90 Tab 0     No current facility-administered medications on file prior to visit.        Allergies, past medical history, past surgical history, family history, social history reviewed and updated    ROS:  All other systems reviewed and are negative except as stated in the HPI     Physical Exam:     /74 (BP Location: Right arm, Patient Position: Sitting, BP Cuff Size: Large adult)   Pulse 69   Temp 36.9 °C (98.5 °F) (Temporal)   Ht 1.778 m (5' 10\")   Wt 95 kg (209 lb 8 oz)   SpO2 96%   BMI 30.06 kg/m²   General: Normal appearing. No distress.  ENT: oropharynx without exudates.    Eyes: conjunctiva clear lids without ptosis  Pulmonary: Clear to ausculation.  Normal effort.   Cardiovascular: Regular rate and rhythm  Abdomen: Soft, " nontender,  Lymph: No cervical or supraclavicular palpable lymph nodes  Psych: Normal mood and affect.     I have reviewed pertinent labs and diagnostic tests associated with this visit with specific comments listed under the assessment and plan below      Assessment and Plan:     50 y.o. male with the following issues.    1. Obesity (BMI 30.0-34.9)  2. Alcohol use  3. Essential hypertension  Continue treatment as above  > Educated heavily regarding the side effect and complication of alcohol, especially in the light of obesity and hypertension, advised to reduce the amount of alcohol that he is taking in one session during weekends mainly  >> Also educated on healthy lifestyle, healthy diet, regular exercise, losing some weight  -Follow-up next visit discussed in detail about the healthy lifestyle as well      Follow Up:      Return in about 2 months (around 3/20/2021) for follow up.   Obesity, alcohol use, hypertension, lifestyle*    Please note that this dictation was created using voice recognition software. I have made every reasonable attempt to correct obvious errors, but I expect that there are errors of grammar and possibly content that I did not discover before finalizing the note.    Signed by: David Hernandez M.D.

## 2021-01-21 NOTE — PATIENT INSTRUCTIONS
Natural vitamins from whole foods (fruit and vegetable)  Vitamin B complex supplement (methylcobalamin B12, methyl folate B9).   Magnesium supplement 200 to 400 mg daily  Vitamin D3 supplement   Essential oil supplement (cod liver oil);    LIFESTYLE MEDICINE:       1) Make SMART lifestyle changes: The lifestyle changes that you need to make are with regards to: nutrition, cardiovascular exercise, sleep, stress management.         2) Nutrition:     1- Reduce carbohydrates, no added sugar at all, try to avoid hidden carbohydrates (including breads, pasta, cereals/oatmeal).  Avoid soda, processed carbs and sugars including cookies, candies, donuts, jellies, avoid all added sugar beverages including avoidance of diet soda/Gatorade, Powerade, Ricky-Aid, sweetened ice tea.  Avoid all the sweeteners.    2- Eat vegetables (organic is much better to avoid herbicide/insecticide): including green leafy vegetables, steamed or cooked with healthy oils such as olive oil, avocado oil or coconut oil.  Avoid vegetable oil (sunflower, soy, corn, canola, sufflower, cottonseed or peanut oil).  Include cruciferous vegetables in your diets such as kale, cabbage, cauliflower, [place] sprouts, broccoli, [employer]    3-Try to focus on fruits with low glycemic index (less amount of fructose sugar), best fruits that are rich with vitamin/minerals as well as antioxidants are berries (you could take up to 1 cup of Berries a day). Avoid fruit juices ( even worse than SODA).     4-Eat healthy fat and meats from grass fed animals (grass fed cow/lamb meat, grass fed chicken/poultry, wild-caught fish and seafood) as well as pasteurized eggs from grass fed chicken    5-when you eat dairy foods, eat as a whole fat with no added sugar, sweetener or flavors.  You could add berries, nuts or seeds into the yogurt.  Avoid skimmed milk/free fat milk/2% milk    6-Eat when you feel hungry. Avoid meals if you don't feel hungry.    7-1 big glass of water, mix   with lemon, add 1-2 tablespoon of apple cider vinegar as well as 2 cloves of garlic>> very helpful    8-INTERMITTENT FASTING is very very very powerful           3) Cardiovascular Exercise: The [facility] disease [facility] recommends a minimum of 150 minutes per week of moderate intensity cardiovascular exercise for weight maintenance and cardiovascular health.  Set this as your initial goal, with at least 30 minutes per session. Types of exercise can include 30 minutes of elliptical, 30 minutes of decently fast jog, 30 minutes of swimming, 30 minutes of heavy gardening (lifting big bags of fertilizer, digging deep holes/ditches).  You can cut down the minute requirements to half, by doing higher intensity sports such as a game of tennis, or soccer.        4) Sleep:    A) Goal: Obtain a minimum of 7-8hours of continuous, uninterrupted, restful sleep per night.    B) Tips for Sleep Hygiene:    I) Go to bed and wake up at consistent times whether work/school day or not.   II) Keep room dark, quiet, and comfortable.  Increase exposure to sunlight during awake times and avoid bright lights (especially anything with a backlight) at least the last 1-2hours before going to sleep.     III) Avoid stimulant or caffeine use in the evening     5) Stress Management: You cannot change the stresses of life necessarily, but you can change how he responds to them. One good way to manage stress is to write things down in order to help you process how to approach things in general or specifically. Another good way is to talk it out with someone you trusts, specifically your significant other or good friend. A definite great way to deal with stress is to have cardiovascular exercise!

## 2021-01-29 ENCOUNTER — PATIENT MESSAGE (OUTPATIENT)
Dept: MEDICAL GROUP | Facility: PHYSICIAN GROUP | Age: 51
End: 2021-01-29

## 2021-01-29 DIAGNOSIS — I10 ESSENTIAL HYPERTENSION: ICD-10-CM

## 2021-01-29 DIAGNOSIS — E78.5 HYPERLIPIDEMIA, UNSPECIFIED HYPERLIPIDEMIA TYPE: ICD-10-CM

## 2021-02-01 RX ORDER — ROSUVASTATIN CALCIUM 5 MG/1
5 TABLET, COATED ORAL EVERY EVENING
Qty: 90 TAB | Refills: 1 | Status: SHIPPED | OUTPATIENT
Start: 2021-02-01 | End: 2021-08-05

## 2021-02-01 RX ORDER — LISINOPRIL 5 MG/1
5 TABLET ORAL
Qty: 90 TAB | Refills: 1 | Status: SHIPPED | OUTPATIENT
Start: 2021-02-01 | End: 2021-08-05

## 2021-02-01 NOTE — PROGRESS NOTES
Pt has had OV within the 12 month protocol and lipid panel is current. 6 month supply sent to pharmacy.   Lab Results   Component Value Date/Time    CHOLSTRLTOT 111 08/14/2020 11:35 AM    LDL 62 08/14/2020 11:35 AM    HDL 39 (A) 08/14/2020 11:35 AM    TRIGLYCERIDE 52 08/14/2020 11:35 AM       Lab Results   Component Value Date/Time    SODIUM 139 08/14/2020 11:35 AM    POTASSIUM 4.1 08/14/2020 11:35 AM    CHLORIDE 106 08/14/2020 11:35 AM    CO2 20 08/14/2020 11:35 AM    GLUCOSE 83 08/14/2020 11:35 AM    BUN 18 08/14/2020 11:35 AM    CREATININE 0.91 08/14/2020 11:35 AM     Lab Results   Component Value Date/Time    ALKPHOSPHAT 62 08/14/2020 11:35 AM    ASTSGOT 19 08/14/2020 11:35 AM    ALTSGPT 28 08/14/2020 11:35 AM    TBILIRUBIN 0.7 08/14/2020 11:35 AM

## 2021-06-21 ENCOUNTER — OFFICE VISIT (OUTPATIENT)
Dept: MEDICAL GROUP | Facility: PHYSICIAN GROUP | Age: 51
End: 2021-06-21
Payer: COMMERCIAL

## 2021-06-21 VITALS
HEIGHT: 70 IN | BODY MASS INDEX: 30.35 KG/M2 | TEMPERATURE: 98.9 F | DIASTOLIC BLOOD PRESSURE: 66 MMHG | HEART RATE: 69 BPM | OXYGEN SATURATION: 93 % | SYSTOLIC BLOOD PRESSURE: 124 MMHG | WEIGHT: 212 LBS

## 2021-06-21 DIAGNOSIS — Z00.00 HEALTH CARE MAINTENANCE: ICD-10-CM

## 2021-06-21 DIAGNOSIS — I10 ESSENTIAL HYPERTENSION: ICD-10-CM

## 2021-06-21 DIAGNOSIS — Z12.12 SCREENING FOR COLORECTAL CANCER: ICD-10-CM

## 2021-06-21 DIAGNOSIS — Z12.11 SCREENING FOR COLORECTAL CANCER: ICD-10-CM

## 2021-06-21 DIAGNOSIS — E66.9 OBESITY (BMI 30.0-34.9): ICD-10-CM

## 2021-06-21 DIAGNOSIS — Z72.0 TOBACCO USE: ICD-10-CM

## 2021-06-21 DIAGNOSIS — R73.01 IMPAIRED FASTING GLUCOSE: ICD-10-CM

## 2021-06-21 DIAGNOSIS — E78.5 HYPERLIPIDEMIA, UNSPECIFIED HYPERLIPIDEMIA TYPE: ICD-10-CM

## 2021-06-21 PROCEDURE — 99214 OFFICE O/P EST MOD 30 MIN: CPT | Performed by: INTERNAL MEDICINE

## 2021-06-21 RX ORDER — NICOTINE 21 MG/24HR
1 PATCH, TRANSDERMAL 24 HOURS TRANSDERMAL EVERY 24 HOURS
Qty: 42 PATCH | Refills: 0 | Status: SHIPPED | OUTPATIENT
Start: 2021-06-21 | End: 2021-08-02

## 2021-06-21 RX ORDER — NICOTINE 21 MG/24HR
1 PATCH, TRANSDERMAL 24 HOURS TRANSDERMAL EVERY 24 HOURS
Qty: 14 PATCH | Refills: 0 | Status: SHIPPED | OUTPATIENT
Start: 2021-06-21 | End: 2021-07-05

## 2021-06-21 RX ORDER — BUPROPION HYDROCHLORIDE 150 MG/1
TABLET ORAL
Qty: 90 TABLET | Refills: 3 | Status: SHIPPED | OUTPATIENT
Start: 2021-06-21 | End: 2021-11-18

## 2021-06-21 ASSESSMENT — FIBROSIS 4 INDEX: FIB4 SCORE: 1.07

## 2021-06-22 NOTE — PROGRESS NOTES
"Established Patient    Chief Complaint   Patient presents with   • Follow-Up       Subjective:     HPI:   Juan M presents today with the following.    3. Essential hypertension  4. Hyperlipidemia, unspecified hyperlipidemia type  6. Obesity (BMI 30.0-34.9)  Patient blood pressure within normal range, he is taking lisinopril 5 mg daily, denies other related symptoms, also is taking rosuvastatin 5 mg daily, would like to perform labs as well as colonoscopy because of his age, denied having other alarm GI signs or symptoms    5. Tobacco use  Reported 1 pack a day since age of 19, patient tried Chantix before however insurance may be was an issue versus developing some side effect, he is open for other options like nicotine and Wellbutrin      Patient Active Problem List    Diagnosis Date Noted   • Obesity (BMI 30.0-34.9) 01/20/2021   • Alcohol use 01/20/2021   • Chronic midline low back pain without sciatica 10/27/2020   • Meralgia paresthetica of left side 07/22/2020   • Impaired fasting glucose 05/07/2019   • Essential hypertension 05/07/2019   • Hyperlipidemia 05/07/2019   • Tobacco use 05/07/2019       Current Outpatient Medications on File Prior to Visit   Medication Sig Dispense Refill   • rosuvastatin (CRESTOR) 5 MG Tab Take 1 Tab by mouth every evening. 90 Tab 1   • lisinopril (PRINIVIL) 5 MG Tab Take 1 Tab by mouth every day. 90 Tab 1     No current facility-administered medications on file prior to visit.       Allergies, past medical history, past surgical history, family history, social history reviewed and updated    ROS:  All other systems reviewed and are negative except as stated in the HPI     Physical Exam:     /66 (BP Location: Left arm, Patient Position: Sitting, BP Cuff Size: Adult)   Pulse 69   Temp 37.2 °C (98.9 °F) (Temporal)   Ht 1.778 m (5' 10\")   Wt 96.2 kg (212 lb)   SpO2 93%   BMI 30.42 kg/m²   General: Normal appearing. No distress.  ENT: oropharynx without exudates.    Eyes: " conjunctiva clear lids without ptosis  Pulmonary: Clear to ausculation.  Normal effort.   Cardiovascular: Regular rate and rhythm  Abdomen: Soft, nontender,  Lymph: No cervical or supraclavicular palpable lymph nodes  Psych: Normal mood and affect.     I have reviewed pertinent labs and diagnostic tests associated with this visit with specific comments listed under the assessment and plan below      Assessment and Plan:     51 y.o. male with the following issues.    1. Screening for colorectal cancer  - REFERRAL TO GI FOR COLONOSCOPY    2. Impaired fasting glucose  - HEMOGLOBIN A1C; Future  - INSULIN FASTING; Future    3. Essential hypertension  4. Hyperlipidemia, unspecified hyperlipidemia type  6. Obesity (BMI 30.0-34.9)  - Comp Metabolic Panel; Future  - CRP HIGH SENSITIVE (CARDIAC); Future  - TSH WITH REFLEX TO FT4; Future  - Lipid Profile; Future    5. Tobacco use  - nicotine (NICODERM) 14 MG/24HR PATCH 24 HR; Place 1 Patch on the skin every 24 hours for 14 days.  Dispense: 14 Patch; Refill: 0  - nicotine (NICODERM) 21 MG/24HR PATCH 24 HR; Place 1 Patch on the skin every 24 hours for 42 days.  Dispense: 42 Patch; Refill: 0  - nicotine (NICODERM) 7 MG/24HR PATCH 24 HR; Place 1 Patch on the skin every 24 hours for 14 days.  Dispense: 14 Patch; Refill: 0  - buPROPion (WELLBUTRIN XL) 150 MG XL tablet; 150 mg once daily for 3 days; increase to 150 mg twice daily (maximum dose: 300 mg/day)  Dispense: 90 tablet; Refill: 3    -Counseled about the negative health risks of smoking, pt expressed understanding  -Encouraged smoking cessation  -CTM and f/u with support to quit     7. Health care maintenance  - MAGNESIUM; Future  - VITAMIN D,25 HYDROXY; Future  - VITAMIN B12; Future  - TSH WITH REFLEX TO FT4; Future      Follow Up:      Return in about 3 months (around 9/21/2021) for follow up.  Labs, tobacco status, lifestyle*  Please note that this dictation was created using voice recognition software. I have made every  reasonable attempt to correct obvious errors, but I expect that there are errors of grammar and possibly content that I did not discover before finalizing the note.    Signed by: David Hernandez M.D.

## 2021-06-23 ENCOUNTER — TELEPHONE (OUTPATIENT)
Dept: MEDICAL GROUP | Facility: PHYSICIAN GROUP | Age: 51
End: 2021-06-23

## 2021-06-23 NOTE — TELEPHONE ENCOUNTER
Pharmacy faxed over requesting 2 prescriptions for the bupropion 150mg and the 300 mg dosings due to insurance not covering the dosing, insurance will only allow once per day.    - Bupropion 150 mg QD  - Bupropion 300 mg QD    Please advise.

## 2021-06-28 NOTE — TELEPHONE ENCOUNTER
In chart, patient has not taken this medication before. New medication prescribed at the last office visit.

## 2021-08-03 DIAGNOSIS — I10 ESSENTIAL HYPERTENSION: ICD-10-CM

## 2021-08-03 DIAGNOSIS — E78.5 HYPERLIPIDEMIA, UNSPECIFIED HYPERLIPIDEMIA TYPE: ICD-10-CM

## 2021-08-05 RX ORDER — ROSUVASTATIN CALCIUM 5 MG/1
TABLET, COATED ORAL
Qty: 90 TABLET | Refills: 0 | Status: SHIPPED | OUTPATIENT
Start: 2021-08-05 | End: 2021-11-08

## 2021-08-05 RX ORDER — LISINOPRIL 5 MG/1
TABLET ORAL
Qty: 90 TABLET | Refills: 0 | Status: SHIPPED | OUTPATIENT
Start: 2021-08-05 | End: 2021-11-08

## 2021-10-21 ENCOUNTER — HOSPITAL ENCOUNTER (OUTPATIENT)
Dept: LAB | Facility: MEDICAL CENTER | Age: 51
End: 2021-10-21
Attending: INTERNAL MEDICINE
Payer: COMMERCIAL

## 2021-10-21 DIAGNOSIS — E78.5 HYPERLIPIDEMIA, UNSPECIFIED HYPERLIPIDEMIA TYPE: ICD-10-CM

## 2021-10-21 DIAGNOSIS — Z00.00 HEALTH CARE MAINTENANCE: ICD-10-CM

## 2021-10-21 DIAGNOSIS — I10 ESSENTIAL HYPERTENSION: ICD-10-CM

## 2021-10-21 DIAGNOSIS — R73.01 IMPAIRED FASTING GLUCOSE: ICD-10-CM

## 2021-10-21 LAB
25(OH)D3 SERPL-MCNC: 45 NG/ML (ref 30–100)
ALBUMIN SERPL BCP-MCNC: 4.7 G/DL (ref 3.2–4.9)
ALBUMIN/GLOB SERPL: 2.4 G/DL
ALP SERPL-CCNC: 67 U/L (ref 30–99)
ALT SERPL-CCNC: 24 U/L (ref 2–50)
ANION GAP SERPL CALC-SCNC: 11 MMOL/L (ref 7–16)
AST SERPL-CCNC: 16 U/L (ref 12–45)
BILIRUB SERPL-MCNC: 1 MG/DL (ref 0.1–1.5)
BUN SERPL-MCNC: 11 MG/DL (ref 8–22)
CALCIUM SERPL-MCNC: 9.2 MG/DL (ref 8.5–10.5)
CHLORIDE SERPL-SCNC: 105 MMOL/L (ref 96–112)
CHOLEST SERPL-MCNC: 145 MG/DL (ref 100–199)
CO2 SERPL-SCNC: 23 MMOL/L (ref 20–33)
CREAT SERPL-MCNC: 1.02 MG/DL (ref 0.5–1.4)
CRP SERPL HS-MCNC: 0.6 MG/L (ref 0–3)
EST. AVERAGE GLUCOSE BLD GHB EST-MCNC: 108 MG/DL
GLOBULIN SER CALC-MCNC: 2 G/DL (ref 1.9–3.5)
GLUCOSE SERPL-MCNC: 91 MG/DL (ref 65–99)
HBA1C MFR BLD: 5.4 % (ref 4–5.6)
HDLC SERPL-MCNC: 40 MG/DL
LDLC SERPL CALC-MCNC: 75 MG/DL
MAGNESIUM SERPL-MCNC: 2.3 MG/DL (ref 1.5–2.5)
POTASSIUM SERPL-SCNC: 4.3 MMOL/L (ref 3.6–5.5)
PROT SERPL-MCNC: 6.7 G/DL (ref 6–8.2)
SODIUM SERPL-SCNC: 139 MMOL/L (ref 135–145)
TRIGL SERPL-MCNC: 150 MG/DL (ref 0–149)
TSH SERPL DL<=0.005 MIU/L-ACNC: 1.08 UIU/ML (ref 0.38–5.33)
VIT B12 SERPL-MCNC: 733 PG/ML (ref 211–911)

## 2021-10-21 PROCEDURE — 82607 VITAMIN B-12: CPT

## 2021-10-21 PROCEDURE — 82306 VITAMIN D 25 HYDROXY: CPT

## 2021-10-21 PROCEDURE — 83735 ASSAY OF MAGNESIUM: CPT

## 2021-10-21 PROCEDURE — 80061 LIPID PANEL: CPT

## 2021-10-21 PROCEDURE — 86141 C-REACTIVE PROTEIN HS: CPT

## 2021-10-21 PROCEDURE — 80053 COMPREHEN METABOLIC PANEL: CPT

## 2021-10-21 PROCEDURE — 83036 HEMOGLOBIN GLYCOSYLATED A1C: CPT

## 2021-10-21 PROCEDURE — 83525 ASSAY OF INSULIN: CPT

## 2021-10-21 PROCEDURE — 84443 ASSAY THYROID STIM HORMONE: CPT

## 2021-10-21 PROCEDURE — 36415 COLL VENOUS BLD VENIPUNCTURE: CPT

## 2021-10-23 LAB — INSULIN P FAST SERPL-ACNC: 4 UIU/ML (ref 3–25)

## 2021-11-06 DIAGNOSIS — I10 ESSENTIAL HYPERTENSION: ICD-10-CM

## 2021-11-06 DIAGNOSIS — E78.5 HYPERLIPIDEMIA, UNSPECIFIED HYPERLIPIDEMIA TYPE: ICD-10-CM

## 2021-11-08 RX ORDER — LISINOPRIL 5 MG/1
TABLET ORAL
Qty: 90 TABLET | Refills: 2 | Status: SHIPPED | OUTPATIENT
Start: 2021-11-08 | End: 2022-09-23

## 2021-11-08 RX ORDER — ROSUVASTATIN CALCIUM 5 MG/1
TABLET, COATED ORAL
Qty: 90 TABLET | Refills: 2 | Status: SHIPPED | OUTPATIENT
Start: 2021-11-08 | End: 2022-09-23

## 2021-11-18 ENCOUNTER — OFFICE VISIT (OUTPATIENT)
Dept: MEDICAL GROUP | Facility: PHYSICIAN GROUP | Age: 51
End: 2021-11-18
Payer: COMMERCIAL

## 2021-11-18 VITALS
OXYGEN SATURATION: 96 % | DIASTOLIC BLOOD PRESSURE: 66 MMHG | WEIGHT: 217.5 LBS | BODY MASS INDEX: 31.14 KG/M2 | TEMPERATURE: 98 F | SYSTOLIC BLOOD PRESSURE: 114 MMHG | HEART RATE: 63 BPM | HEIGHT: 70 IN

## 2021-11-18 DIAGNOSIS — I10 ESSENTIAL HYPERTENSION: ICD-10-CM

## 2021-11-18 DIAGNOSIS — Z72.0 TOBACCO USE: ICD-10-CM

## 2021-11-18 DIAGNOSIS — E78.5 HYPERLIPIDEMIA, UNSPECIFIED HYPERLIPIDEMIA TYPE: ICD-10-CM

## 2021-11-18 PROCEDURE — 99214 OFFICE O/P EST MOD 30 MIN: CPT | Performed by: INTERNAL MEDICINE

## 2021-11-18 RX ORDER — POLYETHYLENE GLYCOL 3350, SODIUM CHLORIDE, SODIUM BICARBONATE, POTASSIUM CHLORIDE 420; 11.2; 5.72; 1.48 G/4L; G/4L; G/4L; G/4L
POWDER, FOR SOLUTION ORAL
COMMUNITY
Start: 2021-10-10 | End: 2021-11-18

## 2021-11-18 RX ORDER — NICOTINE 21 MG/24HR
1 PATCH, TRANSDERMAL 24 HOURS TRANSDERMAL EVERY 24 HOURS
Qty: 14 PATCH | Refills: 0 | Status: SHIPPED | OUTPATIENT
Start: 2021-11-18 | End: 2021-12-02

## 2021-11-18 RX ORDER — NICOTINE 21 MG/24HR
1 PATCH, TRANSDERMAL 24 HOURS TRANSDERMAL EVERY 24 HOURS
Qty: 60 PATCH | Refills: 0 | Status: SHIPPED | OUTPATIENT
Start: 2021-11-18 | End: 2021-11-24 | Stop reason: SDUPTHER

## 2021-11-18 ASSESSMENT — FIBROSIS 4 INDEX: FIB4 SCORE: .972019739199673849

## 2021-11-19 NOTE — PROGRESS NOTES
"Established Patient    Chief Complaint   Patient presents with   • Follow-Up       Subjective:     HPI:   Juan M presents today with the following.    Patient had labs with A1c 5.4, cholesterol panel with elevated triglyceride, reported compliance with Crestor 5 mg daily, denied having related symptoms,    Reported was able to reduce smoking to 3 cigarettes a day then he ran out of nicotine patch and then he had some stress from work as well and a relapse, would like trial again for nicotine patch    Blood pressure stable, is compliant with lisinopril 5 mg daily, denied having related symptoms    Patient Active Problem List    Diagnosis Date Noted   • Obesity (BMI 30.0-34.9) 01/20/2021   • Alcohol use 01/20/2021   • Chronic midline low back pain without sciatica 10/27/2020   • Meralgia paresthetica of left side 07/22/2020   • Impaired fasting glucose 05/07/2019   • Essential hypertension 05/07/2019   • Hyperlipidemia 05/07/2019   • Tobacco use 05/07/2019       Current Outpatient Medications on File Prior to Visit   Medication Sig Dispense Refill   • rosuvastatin (CRESTOR) 5 MG Tab TAKE 1 TABLET BY MOUTH EVERY DAY IN THE EVENING 90 Tablet 2   • lisinopril (PRINIVIL) 5 MG Tab TAKE 1 TABLET BY MOUTH EVERY DAY 90 Tablet 2     No current facility-administered medications on file prior to visit.       Allergies, past medical history, past surgical history, family history, social history reviewed and updated    ROS:  All other systems reviewed and are negative except as stated in the HPI       Physical Exam:     /66 (BP Location: Left arm, Patient Position: Sitting, BP Cuff Size: Adult)   Pulse 63   Temp 36.7 °C (98 °F) (Temporal)   Ht 1.778 m (5' 10\")   Wt 98.7 kg (217 lb 8 oz)   SpO2 96%   BMI 31.21 kg/m²   General: Normal appearing. No distress.  Pulmonary: Clear to ausculation.  Normal effort.   Cardiovascular: Regular rate and rhythm    Assessment and Plan:     51 y.o. male with the following issues.    1. " Essential hypertension  2. Hyperlipidemia, unspecified hyperlipidemia type  3. Tobacco use  - nicotine (NICODERM) 14 MG/24HR PATCH 24 HR; Place 1 Patch on the skin every 24 hours for 14 days.  Dispense: 14 Patch; Refill: 0  - nicotine (NICODERM) 21 MG/24HR PATCH 24 HR; Place 1 Patch on the skin every 24 hours for 60 days.  Dispense: 60 Patch; Refill: 0  - nicotine (NICODERM) 7 MG/24HR PATCH 24 HR; Place 1 Patch on the skin every 24 hours for 14 days.  Dispense: 14 Patch; Refill: 0    -Counseled about the negative health risks of smoking, pt expressed understanding  -Encouraged smoking cessation  -CTM and f/u with support to quit     -discussion in details on target blood pressure goal, advised monitoring BP closely at home.  Have BP log to present at follow-up visit or send through my chart.   -Advised low-salt diet, healthy dietary option include plenty of vegetable, reduce carb and sugar, regular exercise as tolerated, healthy fat/protein, also avoid alcohol, no NSAIDs    Follow Up:      Return in about 3 months (around 2/18/2022).    Please note that this dictation was created using voice recognition software. I have made every reasonable attempt to correct obvious errors, but I expect that there are errors of grammar and possibly content that I did not discover before finalizing the note.    Signed by: David Hernandez M.D.

## 2021-11-19 NOTE — PATIENT INSTRUCTIONS
"Natural vitamins from whole foods  Active vitamin B complex supplement (methylcobalamin B12, methyl folate B9).   Magnesium glycinate supplement  400 mg daily  Vitamin D3 4000 units daily with K2 supplement   Fish oil (cod liver oil)  Turmeric, marialuisa, [place]well[place], black pepper, Cinnamon combination supplement   CoQ10 100 mg daily   Grounding >> watch \"The Earthing Movie with Neil Ingrid\".   Green Tea Matcha Organic     "

## 2021-11-24 DIAGNOSIS — Z72.0 TOBACCO USE: ICD-10-CM

## 2021-11-24 RX ORDER — NICOTINE 21 MG/24HR
1 PATCH, TRANSDERMAL 24 HOURS TRANSDERMAL EVERY 24 HOURS
Qty: 60 PATCH | Refills: 0 | Status: SHIPPED | OUTPATIENT
Start: 2021-11-24 | End: 2022-01-23

## 2022-02-23 ENCOUNTER — APPOINTMENT (OUTPATIENT)
Dept: MEDICAL GROUP | Facility: PHYSICIAN GROUP | Age: 52
End: 2022-02-23
Payer: COMMERCIAL

## 2022-09-23 DIAGNOSIS — E78.5 HYPERLIPIDEMIA, UNSPECIFIED HYPERLIPIDEMIA TYPE: ICD-10-CM

## 2022-09-23 DIAGNOSIS — I10 ESSENTIAL HYPERTENSION: ICD-10-CM

## 2022-09-23 RX ORDER — ROSUVASTATIN CALCIUM 5 MG/1
TABLET, COATED ORAL
Qty: 30 TABLET | Refills: 8 | Status: SHIPPED | OUTPATIENT
Start: 2022-09-23

## 2022-09-23 RX ORDER — LISINOPRIL 5 MG/1
TABLET ORAL
Qty: 30 TABLET | Refills: 8 | Status: SHIPPED | OUTPATIENT
Start: 2022-09-23

## 2023-06-20 ENCOUNTER — PATIENT MESSAGE (OUTPATIENT)
Dept: HEALTH INFORMATION MANAGEMENT | Facility: OTHER | Age: 53
End: 2023-06-20

## 2023-06-20 ENCOUNTER — DOCUMENTATION (OUTPATIENT)
Dept: HEALTH INFORMATION MANAGEMENT | Facility: OTHER | Age: 53
End: 2023-06-20
Payer: COMMERCIAL

## 2023-07-26 ENCOUNTER — TELEPHONE (OUTPATIENT)
Dept: HEALTH INFORMATION MANAGEMENT | Facility: OTHER | Age: 53
End: 2023-07-26
Payer: COMMERCIAL

## 2024-06-06 NOTE — PROCEDURE: ADDITIONAL NOTES
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 Patient was discharged on POD 4 with improved pain control and afebrile. At home she was in her usual state of health until 5/29 and presented to the ED on 5/30 in the setting of fevers of unknown origin.     Patient is currently ordered for a PO diet. Patient currently reports a poor appetite and PO intake at meals during course of admission. Documented on RN flowsheet as consuming 26-75% of meals. Patient deferred writer's offer of an oral nutrition supplement or documentation of food preferences. Patient denies any chewing or swallowing difficulty with current diet order. Patient reports intermittent nausea. No report of vomiting, diarrhea, constipation. Last BM 6/2/24 per RN flowsheet documentation. Not noted to be on a bowel regimen.     Writer provided verbal education regarding current diet order and nutrition recommendations for after discharge, including strategies to promote PO intake in the setting of poor appetite. Patient verbalized understanding to the discussion. 
Detail Level: Simple
Additional Notes: Lesions were pared down prior to LN2\\nRecommend OTC salicylic acid

## 2024-09-20 ENCOUNTER — APPOINTMENT (OUTPATIENT)
Dept: RADIOLOGY | Facility: IMAGING CENTER | Age: 54
End: 2024-09-20
Attending: NURSE PRACTITIONER
Payer: COMMERCIAL

## 2024-09-20 ENCOUNTER — OCCUPATIONAL MEDICINE (OUTPATIENT)
Dept: URGENT CARE | Facility: PHYSICIAN GROUP | Age: 54
End: 2024-09-20
Payer: COMMERCIAL

## 2024-09-20 VITALS
OXYGEN SATURATION: 99 % | SYSTOLIC BLOOD PRESSURE: 100 MMHG | TEMPERATURE: 98.4 F | DIASTOLIC BLOOD PRESSURE: 64 MMHG | HEART RATE: 66 BPM | HEIGHT: 70 IN | WEIGHT: 209 LBS | BODY MASS INDEX: 29.92 KG/M2 | RESPIRATION RATE: 16 BRPM

## 2024-09-20 DIAGNOSIS — S93.601A SPRAIN OF RIGHT FOOT, INITIAL ENCOUNTER: ICD-10-CM

## 2024-09-20 DIAGNOSIS — Z02.1 PRE-EMPLOYMENT DRUG SCREENING: ICD-10-CM

## 2024-09-20 DIAGNOSIS — M79.671 RIGHT FOOT PAIN: ICD-10-CM

## 2024-09-20 DIAGNOSIS — Z02.6 ENCOUNTER RELATED TO WORKER'S COMPENSATION CLAIM: ICD-10-CM

## 2024-09-20 LAB
AMP AMPHETAMINE: NORMAL
BREATH ALCOHOL COMMENT: NORMAL
COC COCAINE: NORMAL
INT CON NEG: NORMAL
INT CON POS: NORMAL
MET METHAMPHETAMINES: NORMAL
OPI OPIATES: NORMAL
PCP PHENCYCLIDINE: NORMAL
POC BREATHALIZER: 0 PERCENT (ref 0–0.01)
POC DRUG COMMENT 753798-OCCUPATIONAL HEALTH: NEGATIVE
THC: NORMAL

## 2024-09-20 PROCEDURE — 3078F DIAST BP <80 MM HG: CPT | Performed by: NURSE PRACTITIONER

## 2024-09-20 PROCEDURE — 99213 OFFICE O/P EST LOW 20 MIN: CPT | Performed by: NURSE PRACTITIONER

## 2024-09-20 PROCEDURE — 73630 X-RAY EXAM OF FOOT: CPT | Mod: TC,RT | Performed by: NURSE PRACTITIONER

## 2024-09-20 PROCEDURE — 82075 ASSAY OF BREATH ETHANOL: CPT | Performed by: NURSE PRACTITIONER

## 2024-09-20 PROCEDURE — 80305 DRUG TEST PRSMV DIR OPT OBS: CPT | Performed by: NURSE PRACTITIONER

## 2024-09-20 PROCEDURE — 3074F SYST BP LT 130 MM HG: CPT | Performed by: NURSE PRACTITIONER

## 2024-09-20 RX ORDER — MELOXICAM 15 MG/1
15 TABLET ORAL
COMMUNITY
Start: 2024-08-19

## 2024-09-20 ASSESSMENT — ENCOUNTER SYMPTOMS
NEUROLOGICAL NEGATIVE: 1
CONSTITUTIONAL NEGATIVE: 1

## 2024-09-20 ASSESSMENT — VISUAL ACUITY: OU: 1

## 2024-09-20 NOTE — LETTER
PHYSICIAN’S AND CHIROPRACTIC PHYSICIAN'S   PROGRESS REPORT CERTIFICATION OF DISABILITY Claim Number:     Social Security Number:    Patient’s Name: Juan M Medellin Date of Injury: 9/20/2024   Employer: [employer] Name of [employer] (if applicable):      Patient’s Job Description/Occupation:        Previous Injuries/Diseases/Surgeries Contributing to the Condition:  No      Diagnosis: (S93.601A) Sprain of right foot, initial encounter  (Z02.6) Encounter related to worker's compensation claim  (Z02.1) Pre-employment drug screening      Related to the Industrial Injury? Yes     Explain: From stepping [facility]      Objective Medical Findings: Musculoskeletal:         General: No deformity. Normal range of motion.      Right ankle: Normal. No swelling, deformity, ecchymosis or lacerations. No tenderness. Normal range of motion. Anterior drawer test negative.      Right Achilles Tendon: Normal.      Right foot: Normal range of motion and normal capillary refill. Swelling and tenderness present. No deformity or laceration.      Comments: Swelling, TTP at dorsal lateral midfoot          None - Discharged                         Stable  Yes                 Ratable  No        Generally Improved                         Condition Worsened                  Condition Same  May Have Suffered a Permanent Disability No     Treatment Plan:    Rest, ice, compression, and elevation (RICE) and over-the-counter acetaminophen alternating with ibuprofen, per 's instructions, as needed for pain. Short walking boot applied. Work restrictions as indicated. Follow up in 5 days. Monitor. Seek immediate medical attention if symptoms change/worsen.          No Change in Therapy                  PT/OT Prescribed                      Medication May be Used While Working        Case Management                          PT/OT Discontinued    Consultation    Further Diagnostic Studies:     Prescription(s)                 Released to FULL DUTY /No Restrictions on (Date):  From:      Certified TOTALLY TEMPORARILY DISABLED (Indicate Dates) From:   To:    X  Released to RESTRICTED/Modified Duty on (Date): From: 9/20/2024 To: 9/25/2024  Restrictions Are:  Temporary      No Sitting    No Standing    No Pulling Other: Standing/walking up to 1 hour/day, must wear walking boot, lifting up to 10 lb; no climbing or use of foot controls       No Bending at Waist     No Stooping     No Lifting        No Carrying     No Walking Lifting Restricted to (lbs.):          No Pushing        No Climbing     No Reaching Above Shoulders       Date of Next Visit:  9/25/2024 Date of this Exam: 9/20/2024 Physician/Chiropractic Physician Name: JOSE Rabago Physician/Chiropractic Physician Signature:  Azar Hoang DO MPH                                                                                                                                                                                                            [id number] (Rev. 2/24)

## 2024-09-20 NOTE — LETTER
"    EMPLOYEE’S CLAIM FOR COMPENSATION/ REPORT OF INITIAL TREATMENT  FORM C-4  PLEASE TYPE OR PRINT    EMPLOYEE’S CLAIM - PROVIDE ALL INFORMATION REQUESTED   First Name                    HAILEE Mcgowan                  Last Name  Vignesh Birthdate                    1970                Sex  Male Claim Number (Insurer’s Use Only)     Home Address  [id number] [place] [address] Age  54 y.o. Height  1.778 m (5' 10\") Weight  94.8 kg (209 lb) Social Security Number     [facility] Zip  [address] Telephone  [phone] (home)    Mailing Address  [address] [place] [address]  [address] Primary Language Spoken  English    INSURER   THIRD-PARTY   Julio Cesar Mckeon   Employee's Occupation (Job Title) When Injury or Occupational Disease Occurred      Employer's Name/Company Name  [employer]  Telephone  [phone]    Office Mail Address (Number and Street)  [address]     Date of Injury (if applicable) 9/20/2024               Hours Injury (if applicable)  7:10 AM Date Employer Notified  9/20/2024 Last Day of Work after Injury or Occupational Disease  9/20/2024 Supervisor to Whom Injury Reported  Fiona Prakash   Address or Location of Accident (if applicable)  Work [1]   What were you doing at the time of accident? 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 (Be specific and answer in detail. Use additional sheet if necessary)     If you believe that you have an occupational disease, when did you first have knowledge of the disability and its relationship to your employment?  NA Witnesses to the Accident (if applicable)  Berenice Morales      Nature of Injury or Occupational Disease  Strain  Part(s) of Body Injured or Affected  Ankle (R) N/A N/A    I CERTIFY THAT THE ABOVE IS TRUE AND CORRECT TO T HE BEST OF MY " KNOWLEDGE AND THAT I HAVE PROVIDED THIS INFORMATION IN ORDER TO OBTAIN THE BENEFITS OF [place]CC[place], OR [place]  I HEREBY AUTHORIZE ANY PHYSICIAN, CHIROPRACTOR, SURGEON, PRACTITIONER OR ANY OTHER PERSON, ANY HOSPITAL, INCLUDING [facility] OR [facility], ANY  MEDICAL SERVICE ORGANIZATION, ANY INSURANCE COMPANY, OR OTHER INSTITUTION OR ORGANIZATION TO RELEASE TO EACH OTHER, ANY MEDICAL OR OTHER INFORMATION, INCLUDING BENEFITS PAID OR PAYABLE, PERTINENT TO THIS INJURY OR DISEASE, EXCEPT INFORMATION RELATIVE TO DIAGNOSIS, TREATMENT AND/OR COUNSELING FOR AIDS, PSYCHOLOGICAL CONDITIONS, ALCOHOL OR CONTROLLED SUBSTANCES, FOR WHICH I MUST GIVE SPECIFIC AUTHORIZATION.  A PHOTOSTAT OF THIS AUTHORIZATION SHALL BE VALID AS THE ORIGINAL.     Date09/20/2024   [facility] Employee’s Original or  *Electronic Signature   THIS REPORT MUST BE COMPLETED AND MAILED WITHIN 3 WORKING DAYS OF TREATMENT   Place  [facility]    Name of Facility  [facility]   Date 9/20/2024 Diagnosis and Description of Injury or Occupational Disease  (S93.601A) Sprain of right foot, initial encounter  (Z02.6) Encounter related to worker's compensation claim  (Z02.1) Pre-employment drug screening  Diagnoses of Sprain of right foot, initial encounter, Encounter related to worker's compensation claim, and Pre-employment drug screening were pertinent to this visit. Is there evidence that the injured employee was under the influence of alcohol and/or another controlled substance at the time of accident?  []No  [] Yes (if yes, please explain)   Hour 12:23 PM  No   Treatment: Rest, ice, compression, and elevation (RICE) and over-the-counter acetaminophen alternating with ibuprofen, per 's instructions, as needed for pain. Short walking boot applied. Work restrictions as indicated. Follow up in 5 days. Monitor. Seek  immediate medical attention if symptoms change/worsen.     Have you advised the patient to remain off work five days or more?   [] Yes Indicate dates: From   To    [x] No      If no, is the injured employee capable of: [] full duty [x] modified duty                     If modified duty, specify any limitations / restrictions:  Per D39                                                                                                                                                                                                                                                                                                                                                                                                               X-Ray Findings: Negative    From information given by the employee, together with medical evidence, can you directly connect this injury or occupational disease as job incurred?  []Yes   [] No Yes    Is additional medical care by a physician indicated? []Yes [] No  Yes    Do you know of any previous injury or disease contributing to this condition or occupational disease? []Yes [] No (Explain if yes)                          No   Date  9/20/2024 Print Health Care Provider’s Name  JOSE Rabago I certify that the employer’s copy of  this form was delivered to the employer on:   Address  [address] [place] [address]. [address] INSURER'S USE ONLY                       [facility]  [id number] Provider’s Tax ID Number  [id number]   Telephone  Dept: [phone]    Health Care Provider’s Original or Electronic Signature  e-[employer]AMLET [employer]cala[employer].R.NHyacinth Degree (MD,DO, DC,PA-C,APRN)  APRN  Choose (if applicable)      ORIGINAL - TREATING HEALTHCARE PROVIDER PAGE 2 - INSURER/TPA PAGE 3 - EMPLOYER PAGE 4 - EMPLOYEE             Form C-4 (rev.08/23)

## 2024-09-20 NOTE — PROGRESS NOTES
"Subjective:     Juan M Medellin is a 54 y.o. 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 Initial visit.    Patient works as a  at [employer].  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  Reports pain, tenderness, and swelling at the dorsal, lateral aspect of the right foot in front of the ankle.  Reports pain radiates to his ankle and his distal lower leg.  Walking with a limp.  Denies previous injury.  Denies second job.    Review of Systems   Constitutional: Negative.    Musculoskeletal:         Per HPI   Neurological: Negative.    All other systems reviewed and are negative.    Refer to HPI for additional details.    During this visit, appropriate PPE was worn and hand hygiene was performed.    PMH: No pertinent past medical history to this problem  MEDS: Medications were reviewed in Epic  ALLERGIES: Allergies were reviewed in Epic  SOCHX: Works as a  at [employer]  FH: No pertinent family history to this problem      Objective:     /64 (BP Location: Right arm, Patient Position: Sitting, BP Cuff Size: Adult)   Pulse 66   Temp 36.9 °C (98.4 °F) (Temporal)   Resp 16   Ht 1.778 m (5' 10\")   Wt 94.8 kg (209 lb)   SpO2 99%   BMI 29.99 kg/m²     Physical Exam  Nursing note reviewed.   Constitutional:       General: He is not in acute distress.     Appearance: He is well-developed. He is not ill-appearing or toxic-appearing.   Eyes:      General: Vision grossly intact.   Cardiovascular:      Rate and Rhythm: Normal rate.   Pulmonary:      Effort: Pulmonary effort is normal. No respiratory distress.   Musculoskeletal:         General: No deformity. Normal range of motion.      Right ankle: Normal. No swelling, deformity, ecchymosis or lacerations. No tenderness. Normal range of motion. Anterior drawer test " negative.      Right Achilles Tendon: Normal.      Right foot: Normal range of motion and normal capillary refill. Swelling and tenderness present. No deformity or laceration.      Comments: Swelling, TTP at dorsal lateral midfoot   Skin:     General: Skin is warm and dry.      Coloration: Skin is not pale.      Findings: No bruising, erythema or rash.   Neurological:      Mental Status: He is alert and oriented to person, place, and time.      Motor: No weakness.      Gait: Gait abnormal (Antalgic).   Psychiatric:         Behavior: Behavior normal. Behavior is cooperative.     XR of R foot:    Details    Reading Physician Reading Date Result Priority   Wing Mcarthur M.D.  [phone] 9/20/2024      Narrative & Impression     9/20/2024 12:57 PM     HISTORY/REASON FOR EXAM:  Pain/Deformity Following Trauma     TECHNIQUE/EXAM DESCRIPTION AND NUMBER OF VIEWS:     3 of the right foot     COMPARISON: None.     FINDINGS:     There is normal bony mineralization.  There is no evidence of fracture, dislocation, or osseous lesion.  There is no evidence of soft tissue injury.     IMPRESSION:    1.  No radiographic evidence of acute injury.           Exam Ended: 09/20/24  1:07 PM Last Resulted: 09/20/24  1:09 PM        Radiology report and images reviewed by myself. Concur with findings.      Assessment/Plan:     1. Sprain of right foot, initial encounter  - DX-FOOT-COMPLETE 3+ RIGHT; Future    2. Encounter related to worker's compensation claim  - POCT 6 Panel Urine Drug Screen  - POCT Breath Alcohol Test    3. Pre-employment drug screening    Rest, ice, compression, and elevation (RICE) and over-the-counter acetaminophen alternating with ibuprofen, per 's instructions, as needed for pain. Short walking boot applied. Work restrictions as indicated. Follow up in 5 days. Monitor. Seek immediate medical attention if symptoms change/worsen.      Differential diagnosis, natural history, supportive care,  over-the-counter symptom management per 's instructions, close monitoring, and indications for immediate follow-up discussed.     All questions answered. Patient agrees with the plan of care.

## 2024-09-28 ENCOUNTER — OCCUPATIONAL MEDICINE (OUTPATIENT)
Dept: URGENT CARE | Facility: PHYSICIAN GROUP | Age: 54
End: 2024-09-28
Payer: COMMERCIAL

## 2024-09-28 VITALS
BODY MASS INDEX: 28.77 KG/M2 | SYSTOLIC BLOOD PRESSURE: 106 MMHG | WEIGHT: 201 LBS | OXYGEN SATURATION: 97 % | HEIGHT: 70 IN | HEART RATE: 65 BPM | RESPIRATION RATE: 16 BRPM | TEMPERATURE: 98 F | DIASTOLIC BLOOD PRESSURE: 69 MMHG

## 2024-09-28 DIAGNOSIS — Z02.6 ENCOUNTER RELATED TO WORKER'S COMPENSATION CLAIM: ICD-10-CM

## 2024-09-28 DIAGNOSIS — S93.601D RIGHT FOOT SPRAIN, SUBSEQUENT ENCOUNTER: ICD-10-CM

## 2024-09-28 ASSESSMENT — PAIN SCALES - GENERAL: PAINLEVEL: 5=MODERATE PAIN

## 2024-09-28 NOTE — PROGRESS NOTES
"Verbal consent was acquired by the patient to use [employer] ambient listening note generation during this visit.    Subjective:     HPI:   History of Present Illness  The patient is a 54-year-old male here for a workman's comp follow-up visit for an injury to his right foot. The initial date of injury is 09/20/2024.    He reports a general improvement in his condition since the last visit, with only mild pain persisting. He experiences pain at the base of his toes and stiffness when moving them. He notes that the swelling has not completely subsided, although the color of the foot has improved.    He has been able to return to work with certain restrictions, such as limiting standing time to an hour and avoiding lifting objects over 10 pounds. He has been able to walk on the injured foot, and today marks the first day without the boot, which he finds uncomfortable.  He is improved overall, but is still limping.       Objective:     Exam:  /69 (BP Location: Right arm, Patient Position: Sitting, BP Cuff Size: Adult)   Pulse 65   Temp 36.7 °C (98 °F) (Temporal)   Resp 16   Ht 1.778 m (5' 10\")   Wt 91.2 kg (201 lb)   SpO2 97%   BMI 28.84 kg/m²  Body mass index is 28.84 kg/m².    Physical Exam  Vitals and nursing note reviewed.   Constitutional:       General: He is not in acute distress.     Appearance: Normal appearance. He is not ill-appearing.   HENT:      Head: Normocephalic and atraumatic.      Nose: Nose normal.   Cardiovascular:      Rate and Rhythm: Normal rate.      Pulses: Normal pulses.   Pulmonary:      Effort: Pulmonary effort is normal.   Musculoskeletal:      Cervical back: Normal range of motion.      Right foot: Decreased range of motion. No deformity, bunion, Charcot foot, foot drop or prominent metatarsal heads.        Feet:    Feet:      Comments: There is pain to palpation over the base of the second, third and fourth metatarsals with bruising and swelling to the area.  There is " limited ROM of the toes due to pain.  There is pain to palpation over the lateral ankle with associated bruising and swelling as well.  Patient is able to bear weight but walks with a limp.    Neurological:      Mental Status: He is alert.           Assessment & Plan:     1. Right foot sprain, subsequent encounter        2. Encounter related to worker's compensation claim            Assessment & Plan  1. Right foot strain/sprain, improving.   The patient's right foot injury, initially sustained on 9/20/2024, continues to show swelling and bruising. He reports some pain and stiffness but is able to walk on it. He has been working with restrictions, including limited weight-bearing and no lifting over 10 pounds, which have not worsened his condition. He is advised to continue these restrictions for another week and to use ice and elevation to manage swelling and pain.    Follow-up  Return in 1 week for follow up.    RICE TREATMENT FOR EXTREMITY INJURIES:  R-rest the extremity as much as possible while pain and swelling persist  I-ice the extremity 15 minutes every 2 hours for the first 24 hours, then 4-5 times daily   C-compress the extremity either with splint or ace wrap as directed  E-elevate the extremity to help with swelling          ISACC Burkett.      Please note that this dictation was created using voice recognition software. I have made every reasonable attempt to correct obvious errors, but I expect that there are errors of grammar and possibly content that I did not discover before finalizing the note.

## 2024-09-28 NOTE — LETTER
"PHYSICIAN’S AND CHIROPRACTIC PHYSICIAN'S   PROGRESS REPORT CERTIFICATION OF DISABILITY Claim Number:     Social Security Number:    Patient’s Name: Juan M Medellin Date of Injury: 9/20/2024   Employer: [employer] Name of MCO (if applicable):      Patient’s Job Description/Occupation:        Previous Injuries/Diseases/Surgeries Contributing to the Condition:         Diagnosis: (S93.601D) Right foot sprain, subsequent encounter  (Z02.6) Encounter related to worker's compensation claim      Related to the Industrial Injury? Yes     Explain: The patient is a 54-year-old male here for a workman's comp follow-up visit for an injury to his right foot. The initial date of injury is 09/20/2024.    He reports a general improvement in his condition since the last visit, with only mild pain persisting. He experiences pain at the base of his toes and stiffness when moving them. He notes that the swelling has not completely subsided, although the color of the foot has improved.    He has been able to return to work with certain restrictions, such as limiting standing time to an hour and avoiding lifting objects over 10 pounds. He has been able to walk on the injured foot, and today marks the first day without the boot, which he finds uncomfortable.  He is improved overall, but is still limping.           Objective Medical Findings: /69 (BP Location: Right arm, Patient Position: Sitting, BP Cuff Size: Adult)   Pulse 65   Temp 36.7 °C (98 °F) (Temporal)   Resp 16   Ht 1.778 m (5' 10\")   Wt 91.2 kg (201 lb)   SpO2 97%   BMI 28.84 kg/m²  Body mass index is 28.84 kg/m².    Physical Exam  Vitals and nursing note reviewed.   Constitutional:       General: He is not in acute distress.     Appearance: Normal appearance. He is not ill-appearing.   HENT:      Head: Normocephalic and atraumatic.      Nose: Nose normal.   Cardiovascular:      Rate and Rhythm: Normal rate.      Pulses: " Normal pulses.   Pulmonary:      Effort: Pulmonary effort is normal.   Musculoskeletal:      Cervical back: Normal range of motion.      Right foot: Decreased range of motion. No deformity, bunion, Charcot foot, foot drop or prominent metatarsal heads.        Feet:     Feet:      Comments: There is pain to palpation over the base of the second, third and fourth metatarsals with bruising and swelling to the area.  There is limited ROM of the toes due to pain.  There is pain to palpation over the lateral ankle with associated bruising and swelling as well.  Patient is able to bear weight but walks with a limp.    Neurological:      Mental Status: He is alert.                None - Discharged                         Stable  No                 Ratable  No     X   Generally Improved                         Condition Worsened                  Condition Same  May Have Suffered a Permanent Disability No     Treatment Plan:              No Change in Therapy                  PT/OT Prescribed                      Medication May be Used While Working        Case Management                          PT/OT Discontinued    Consultation    Further Diagnostic Studies:    Prescription(s)                 Released to FULL DUTY /No Restrictions on (Date):  From:      Certified TOTALLY TEMPORARILY DISABLED (Indicate Dates) From:   To:    X  Released to RESTRICTED/Modified Duty on (Date): From: 9/28/2024 To: 10/4/2024  Restrictions Are:  Temporary      No Sitting    No Standing    No Pulling Other: No standing/walking greater than 1 hour without sitting       No Bending at Waist     No Stooping     No Lifting        No Carrying     No Walking Lifting Restricted to (lbs.):  < or = to 10 pounds       No Pushing        No Climbing     No Reaching Above Shoulders       Date of Next Visit:  10/4/2024  @ 8:00am  Date of this Exam: 9/28/2024 Physician/Chiropractic Physician Name: JOSE Burkett Physician/Chiropractic Physician  Signature:  Azar Hoang DO MPH                                                                                                                                                                                                            D-39 (Rev. 2/24)

## 2024-10-04 ENCOUNTER — OCCUPATIONAL MEDICINE (OUTPATIENT)
Dept: URGENT CARE | Facility: PHYSICIAN GROUP | Age: 54
End: 2024-10-04
Payer: COMMERCIAL

## 2024-10-04 VITALS
SYSTOLIC BLOOD PRESSURE: 122 MMHG | WEIGHT: 210 LBS | DIASTOLIC BLOOD PRESSURE: 70 MMHG | RESPIRATION RATE: 16 BRPM | HEIGHT: 70 IN | OXYGEN SATURATION: 98 % | HEART RATE: 76 BPM | TEMPERATURE: 98.7 F | BODY MASS INDEX: 30.06 KG/M2

## 2024-10-04 DIAGNOSIS — Z02.6 ENCOUNTER RELATED TO WORKER'S COMPENSATION CLAIM: ICD-10-CM

## 2024-10-04 DIAGNOSIS — S93.601D RIGHT FOOT SPRAIN, SUBSEQUENT ENCOUNTER: ICD-10-CM

## 2024-10-04 PROCEDURE — 99213 OFFICE O/P EST LOW 20 MIN: CPT

## 2024-10-04 PROCEDURE — 3074F SYST BP LT 130 MM HG: CPT

## 2024-10-04 PROCEDURE — 1126F AMNT PAIN NOTED NONE PRSNT: CPT

## 2024-10-04 PROCEDURE — 3078F DIAST BP <80 MM HG: CPT

## 2024-10-04 ASSESSMENT — VISUAL ACUITY: OU: 1

## 2024-10-04 ASSESSMENT — PAIN SCALES - GENERAL: PAINLEVEL: NO PAIN

## 2024-10-11 ENCOUNTER — OCCUPATIONAL MEDICINE (OUTPATIENT)
Dept: URGENT CARE | Facility: PHYSICIAN GROUP | Age: 54
End: 2024-10-11
Payer: COMMERCIAL

## 2024-10-11 VITALS
BODY MASS INDEX: 30.06 KG/M2 | HEIGHT: 70 IN | HEART RATE: 62 BPM | SYSTOLIC BLOOD PRESSURE: 124 MMHG | DIASTOLIC BLOOD PRESSURE: 76 MMHG | RESPIRATION RATE: 18 BRPM | WEIGHT: 210 LBS | OXYGEN SATURATION: 98 % | TEMPERATURE: 98.5 F

## 2024-10-11 DIAGNOSIS — S93.601D RIGHT FOOT SPRAIN, SUBSEQUENT ENCOUNTER: ICD-10-CM

## 2024-10-11 PROCEDURE — 3078F DIAST BP <80 MM HG: CPT | Performed by: PHYSICIAN ASSISTANT

## 2024-10-11 PROCEDURE — 99213 OFFICE O/P EST LOW 20 MIN: CPT | Performed by: PHYSICIAN ASSISTANT

## 2024-10-11 PROCEDURE — 3074F SYST BP LT 130 MM HG: CPT | Performed by: PHYSICIAN ASSISTANT

## 2024-10-11 ASSESSMENT — ENCOUNTER SYMPTOMS
TINGLING: 0
MYALGIAS: 0